# Patient Record
Sex: FEMALE | Race: BLACK OR AFRICAN AMERICAN | Employment: UNEMPLOYED | URBAN - METROPOLITAN AREA
[De-identification: names, ages, dates, MRNs, and addresses within clinical notes are randomized per-mention and may not be internally consistent; named-entity substitution may affect disease eponyms.]

---

## 2017-05-10 PROBLEM — F41.9 ANXIETY: Status: ACTIVE | Noted: 2017-05-10

## 2017-06-02 PROBLEM — R53.82 CHRONIC FATIGUE: Status: ACTIVE | Noted: 2017-06-02

## 2017-08-09 PROBLEM — E55.9 VITAMIN D DEFICIENCY: Status: ACTIVE | Noted: 2017-08-09

## 2019-10-01 PROBLEM — K21.9 GERD (GASTROESOPHAGEAL REFLUX DISEASE): Status: ACTIVE | Noted: 2017-04-28

## 2020-04-30 PROBLEM — G47.31 PRIMARY CENTRAL SLEEP APNEA: Status: ACTIVE | Noted: 2020-04-30

## 2020-04-30 PROBLEM — B35.1 DERMATOPHYTOSIS OF NAIL: Status: ACTIVE | Noted: 2020-04-30

## 2020-04-30 PROBLEM — R26.9 ABNORMAL GAIT: Status: ACTIVE | Noted: 2020-04-30

## 2020-05-18 PROBLEM — N34.2 INFECTIVE URETHRITIS: Status: ACTIVE | Noted: 2020-05-18

## 2020-05-18 PROBLEM — E87.5 HYPERKALEMIA: Status: ACTIVE | Noted: 2020-05-18

## 2020-05-18 PROBLEM — M79.89 LEG SWELLING: Status: ACTIVE | Noted: 2020-05-18

## 2020-05-18 PROBLEM — R25.3 JERKING: Status: ACTIVE | Noted: 2020-05-18

## 2020-05-18 PROBLEM — R30.0 DYSURIA: Status: ACTIVE | Noted: 2020-05-18

## 2022-05-18 ENCOUNTER — HOSPITAL ENCOUNTER (EMERGENCY)
Age: 87
Discharge: HOME | End: 2022-05-19
Attending: EMERGENCY MEDICINE
Payer: COMMERCIAL

## 2022-05-18 ENCOUNTER — APPOINTMENT (OUTPATIENT)
Dept: CT IMAGING | Age: 87
End: 2022-05-18
Payer: COMMERCIAL

## 2022-05-18 VITALS
HEART RATE: 72 BPM | SYSTOLIC BLOOD PRESSURE: 141 MMHG | HEIGHT: 66 IN | BODY MASS INDEX: 38.57 KG/M2 | WEIGHT: 240 LBS | OXYGEN SATURATION: 98 % | RESPIRATION RATE: 18 BRPM | TEMPERATURE: 98.6 F | DIASTOLIC BLOOD PRESSURE: 72 MMHG

## 2022-05-18 DIAGNOSIS — R51.9 SINUS HEADACHE: Primary | ICD-10-CM

## 2022-05-18 DIAGNOSIS — G44.89 OTHER HEADACHE SYNDROME: ICD-10-CM

## 2022-05-18 LAB
ABSOLUTE EOS #: 0.08 K/UL (ref 0–0.44)
ABSOLUTE IMMATURE GRANULOCYTE: 0.01 K/UL (ref 0–0.3)
ABSOLUTE LYMPH #: 2.25 K/UL (ref 1.1–3.7)
ABSOLUTE MONO #: 0.58 K/UL (ref 0.1–1.2)
ALBUMIN SERPL-MCNC: 4.2 G/DL (ref 3.5–5.2)
ALP BLD-CCNC: 82 U/L (ref 35–104)
ALT SERPL-CCNC: 12 U/L (ref 5–33)
ANION GAP SERPL CALCULATED.3IONS-SCNC: 12 MMOL/L (ref 9–17)
AST SERPL-CCNC: 14 U/L
BASOPHILS # BLD: 1 % (ref 0–2)
BASOPHILS ABSOLUTE: 0.06 K/UL (ref 0–0.2)
BILIRUB SERPL-MCNC: 0.21 MG/DL (ref 0.3–1.2)
BUN BLDV-MCNC: 29 MG/DL (ref 8–23)
BUN/CREAT BLD: 22 (ref 9–20)
CALCIUM SERPL-MCNC: 9.7 MG/DL (ref 8.6–10.4)
CHLORIDE BLD-SCNC: 102 MMOL/L (ref 98–107)
CO2: 26 MMOL/L (ref 20–31)
CREAT SERPL-MCNC: 1.31 MG/DL (ref 0.5–0.9)
EOSINOPHILS RELATIVE PERCENT: 1 % (ref 1–4)
FLU A ANTIGEN: NEGATIVE
FLU B ANTIGEN: NEGATIVE
GFR AFRICAN AMERICAN: 47 ML/MIN
GFR NON-AFRICAN AMERICAN: 39 ML/MIN
GFR SERPL CREATININE-BSD FRML MDRD: ABNORMAL ML/MIN/{1.73_M2}
GLUCOSE BLD-MCNC: 132 MG/DL (ref 70–99)
GLUCOSE BLD-MCNC: 139 MG/DL (ref 65–105)
HCT VFR BLD CALC: 46.2 % (ref 36.3–47.1)
HEMOGLOBIN: 14.7 G/DL (ref 11.9–15.1)
IMMATURE GRANULOCYTES: 0 %
LYMPHOCYTES # BLD: 35 % (ref 24–43)
MCH RBC QN AUTO: 31.3 PG (ref 25.2–33.5)
MCHC RBC AUTO-ENTMCNC: 31.8 G/DL (ref 28.4–34.8)
MCV RBC AUTO: 98.5 FL (ref 82.6–102.9)
MONOCYTES # BLD: 9 % (ref 3–12)
NRBC AUTOMATED: 0 PER 100 WBC
PDW BLD-RTO: 12.1 % (ref 11.8–14.4)
PLATELET # BLD: 281 K/UL (ref 138–453)
PMV BLD AUTO: 10 FL (ref 8.1–13.5)
POTASSIUM SERPL-SCNC: 4.5 MMOL/L (ref 3.7–5.3)
RBC # BLD: 4.69 M/UL (ref 3.95–5.11)
SEG NEUTROPHILS: 54 % (ref 36–65)
SEGMENTED NEUTROPHILS ABSOLUTE COUNT: 3.46 K/UL (ref 1.5–8.1)
SODIUM BLD-SCNC: 140 MMOL/L (ref 135–144)
TOTAL PROTEIN: 7.4 G/DL (ref 6.4–8.3)
WBC # BLD: 6.4 K/UL (ref 3.5–11.3)

## 2022-05-18 PROCEDURE — 82947 ASSAY GLUCOSE BLOOD QUANT: CPT

## 2022-05-18 PROCEDURE — 96374 THER/PROPH/DIAG INJ IV PUSH: CPT

## 2022-05-18 PROCEDURE — 70450 CT HEAD/BRAIN W/O DYE: CPT

## 2022-05-18 PROCEDURE — 80053 COMPREHEN METABOLIC PANEL: CPT

## 2022-05-18 PROCEDURE — 2580000003 HC RX 258: Performed by: PHYSICIAN ASSISTANT

## 2022-05-18 PROCEDURE — 6360000002 HC RX W HCPCS: Performed by: PHYSICIAN ASSISTANT

## 2022-05-18 PROCEDURE — 99284 EMERGENCY DEPT VISIT MOD MDM: CPT

## 2022-05-18 PROCEDURE — 85025 COMPLETE CBC W/AUTO DIFF WBC: CPT

## 2022-05-18 PROCEDURE — 87804 INFLUENZA ASSAY W/OPTIC: CPT

## 2022-05-18 PROCEDURE — 96375 TX/PRO/DX INJ NEW DRUG ADDON: CPT

## 2022-05-18 PROCEDURE — 96361 HYDRATE IV INFUSION ADD-ON: CPT

## 2022-05-18 PROCEDURE — 96376 TX/PRO/DX INJ SAME DRUG ADON: CPT

## 2022-05-18 RX ORDER — DEXAMETHASONE SODIUM PHOSPHATE 4 MG/ML
4 INJECTION, SOLUTION INTRA-ARTICULAR; INTRALESIONAL; INTRAMUSCULAR; INTRAVENOUS; SOFT TISSUE ONCE
Status: COMPLETED | OUTPATIENT
Start: 2022-05-18 | End: 2022-05-18

## 2022-05-18 RX ORDER — MORPHINE SULFATE 2 MG/ML
2 INJECTION, SOLUTION INTRAMUSCULAR; INTRAVENOUS ONCE
Status: COMPLETED | OUTPATIENT
Start: 2022-05-18 | End: 2022-05-18

## 2022-05-18 RX ORDER — KETOROLAC TROMETHAMINE 15 MG/ML
15 INJECTION, SOLUTION INTRAMUSCULAR; INTRAVENOUS ONCE
Status: COMPLETED | OUTPATIENT
Start: 2022-05-18 | End: 2022-05-18

## 2022-05-18 RX ORDER — MORPHINE SULFATE 2 MG/ML
2 INJECTION, SOLUTION INTRAMUSCULAR; INTRAVENOUS ONCE
Status: COMPLETED | OUTPATIENT
Start: 2022-05-18 | End: 2022-05-19

## 2022-05-18 RX ORDER — ONDANSETRON 2 MG/ML
4 INJECTION INTRAMUSCULAR; INTRAVENOUS ONCE
Status: COMPLETED | OUTPATIENT
Start: 2022-05-18 | End: 2022-05-18

## 2022-05-18 RX ORDER — 0.9 % SODIUM CHLORIDE 0.9 %
500 INTRAVENOUS SOLUTION INTRAVENOUS ONCE
Status: COMPLETED | OUTPATIENT
Start: 2022-05-18 | End: 2022-05-18

## 2022-05-18 RX ORDER — DIPHENHYDRAMINE HYDROCHLORIDE 50 MG/ML
12.5 INJECTION INTRAMUSCULAR; INTRAVENOUS ONCE
Status: COMPLETED | OUTPATIENT
Start: 2022-05-18 | End: 2022-05-18

## 2022-05-18 RX ADMIN — DEXAMETHASONE SODIUM PHOSPHATE 4 MG: 4 INJECTION, SOLUTION INTRAMUSCULAR; INTRAVENOUS at 22:23

## 2022-05-18 RX ADMIN — KETOROLAC TROMETHAMINE 15 MG: 15 INJECTION, SOLUTION INTRAMUSCULAR; INTRAVENOUS at 23:56

## 2022-05-18 RX ADMIN — ONDANSETRON 4 MG: 2 INJECTION INTRAMUSCULAR; INTRAVENOUS at 22:23

## 2022-05-18 RX ADMIN — SODIUM CHLORIDE 500 ML: 0.9 INJECTION, SOLUTION INTRAVENOUS at 22:24

## 2022-05-18 RX ADMIN — MORPHINE SULFATE 2 MG: 2 INJECTION, SOLUTION INTRAMUSCULAR; INTRAVENOUS at 22:38

## 2022-05-18 RX ADMIN — DIPHENHYDRAMINE HYDROCHLORIDE 12.5 MG: 50 INJECTION, SOLUTION INTRAMUSCULAR; INTRAVENOUS at 22:23

## 2022-05-18 ASSESSMENT — ENCOUNTER SYMPTOMS
BLURRED VISION: 0
VOMITING: 0
COUGH: 0
TINGLING: 0
ABDOMINAL PAIN: 0
VISUAL CHANGE: 0
SORE THROAT: 0
SWOLLEN GLANDS: 0
PHOTOPHOBIA: 0
NAUSEA: 0

## 2022-05-18 ASSESSMENT — PAIN SCALES - GENERAL: PAINLEVEL_OUTOF10: 10

## 2022-05-18 ASSESSMENT — PAIN - FUNCTIONAL ASSESSMENT: PAIN_FUNCTIONAL_ASSESSMENT: 0-10

## 2022-05-19 PROCEDURE — 6360000002 HC RX W HCPCS: Performed by: PHYSICIAN ASSISTANT

## 2022-05-19 RX ORDER — ONDANSETRON 4 MG/1
4 TABLET, ORALLY DISINTEGRATING ORAL EVERY 8 HOURS PRN
Qty: 15 TABLET | Refills: 0 | Status: SHIPPED | OUTPATIENT
Start: 2022-05-19

## 2022-05-19 RX ORDER — FLUTICASONE PROPIONATE 50 MCG
1 SPRAY, SUSPENSION (ML) NASAL DAILY
Qty: 16 G | Refills: 0 | Status: SHIPPED | OUTPATIENT
Start: 2022-05-19

## 2022-05-19 RX ORDER — DIPHENHYDRAMINE HCL 25 MG
25 CAPSULE ORAL 2 TIMES DAILY PRN
Qty: 15 CAPSULE | Refills: 0 | Status: SHIPPED | OUTPATIENT
Start: 2022-05-19 | End: 2022-05-29

## 2022-05-19 RX ADMIN — MORPHINE SULFATE 2 MG: 2 INJECTION, SOLUTION INTRAMUSCULAR; INTRAVENOUS at 00:02

## 2022-05-19 NOTE — ED PROVIDER NOTES
[facility] ED  eMERGENCY dEPARTMENT eNCOUnter      Pt Name: Melissa Manzano  MRN: [id number]  [place] 1935  Date of evaluation: 5/18/22      CHIEF COMPLAINT       Chief Complaint   Patient presents with    Headache     x4 days, history of migraines         HISTORY OF PRESENT ILLNESS    Melissa Mnazano is a 81 y.o. female who presents complaining of headache started about 4 days ago she has a history of migraines but has not had one in quite some time. This headache is similar to what she has had in the past was a gradual onset not the worst headache of her life. The history is provided by the patient. Headache  Pain location:  Generalized  Quality:  Dull  Onset quality:  Gradual  Duration:  2 days  Timing:  Intermittent  Progression:  Waxing and waning  Chronicity:  New  Similar to prior headaches: yes    Relieved by:  Nothing  Worsened by:  Nothing  Ineffective treatments:  None tried  Associated symptoms: no abdominal pain, no blurred vision, no cough, no dizziness, no focal weakness, no hearing loss, no loss of balance, no nausea, no neck pain, no neck stiffness, no numbness, no paresthesias, no photophobia, no seizures, no sore throat, no swollen glands, no syncope, no tingling, no visual change and no vomiting        REVIEW OF SYSTEMS       Review of Systems   HENT: Negative for hearing loss and sore throat. Eyes: Negative for blurred vision and photophobia. Respiratory: Negative for cough. Cardiovascular: Negative for syncope. Gastrointestinal: Negative for abdominal pain, nausea and vomiting. Musculoskeletal: Negative for neck pain and neck stiffness. Neurological: Positive for headaches. Negative for dizziness, focal weakness, seizures, numbness, paresthesias and loss of balance. All other systems reviewed and are negative.       PAST MEDICAL HISTORY     Past Medical History:   Diagnosis Date    Allergic rhinitis 7/7/2014    Asthma 8/28/2014    Back pain     Cataract     Diabetes mellitus ([facility] [address]     DM2 8/28/2014    Headache(784.0)     HTN (hypertension) 8/28/2014    Hypercholesterolemia     Hyperlipidemia     Hypertension     Insomnia, unspecified 8/28/2014    Lumbar stenosis     Neuropathy     Neuropathy 8/28/2014    Osteoarthritis     Osteoarthrosis 8/28/2014    Other acute reactions to stress 8/28/2014    Other secondary hypertension, benign 8/28/2014    Pure hypercholesterolemia 8/28/2014    Sleep apnea 8/28/2014    Unspecified sleep apnea        SURGICAL HISTORY       Past Surgical History:   Procedure Laterality Date    ARTERY BIOPSY Bilateral 08/05/2016    bilateral temporal artery biopsy    CATARACT REMOVAL      bilateral eyes    HEMORRHOID SURGERY         CURRENT MEDICATIONS       Previous Medications    AMLODIPINE (NORVASC) 10 MG TABLET    Take 1 tablet by mouth once daily    ASPIRIN 81 MG EC TABLET    Take 1 tablet by mouth daily    BLOOD GLUCOSE MONITOR STRIPS    1 strip by Other route daily Test 1 times a day & as needed for symptoms of irregular blood glucose. Dispense sufficient amount for indicated testing frequency plus additional to accommodate PRN testing needs.     CELECOXIB (CELEBREX) 200 MG CAPSULE    Take 1 capsule by mouth once daily    CONTINUOUS BLOOD GLUC  (FREESTYLE SANGEETHA 14 DAY READER) [facility]    USE AS DIRECTED TWICE DAILY    CONTINUOUS BLOOD GLUC SENSOR (FREESTYLE SANGEETHA 14 DAY SENSOR) [facility]    USE TO TEST TWICE DAILY    ELASTIC BANDAGES & SUPPORTS (WRIST SPLINT/COCK-UP/RIGHT M) MISC    1 each by Does not apply route daily    FREESTYLE LANCETS MISC    1 each by Does not apply route daily    FUROSEMIDE (LASIX) 20 MG TABLET    Take 1 tablet by mouth once daily    GABAPENTIN (NEURONTIN) 300 MG CAPSULE    TAKE 1 CAPSULE BY MOUTH THREE TIMES DAILY    GLUCOSE MONITORING (FREESTYLE FREEDOM) KIT    1 kit by Does not apply route daily    GLUCOSE MONITORING (FREESTYLE) KIT    1 kit by Does not apply route daily    LIDOCAINE (LIDODERM) 5 % Place 1-3 patches onto the skin every 12 hours    METOPROLOL SUCCINATE (TOPROL XL) 100 MG EXTENDED RELEASE TABLET    Take 1 tablet by mouth twice daily    OMEPRAZOLE (PRILOSEC) 20 MG DELAYED RELEASE CAPSULE    TAKE 1 CAPSULE BY MOUTH ONCE DAILY    ONDANSETRON (ZOFRAN) 4 MG TABLET    Take 1 tablet by mouth 3 times daily as needed for Nausea or Vomiting    OXYCODONE-ACETAMINOPHEN (PERCOCET) 5-325 MG PER TABLET    Take 1 tablet by mouth 2 times daily. PANTOPRAZOLE (PROTONIX) 40 MG TABLET    Take 1 tablet by mouth every morning (before breakfast)    POTASSIUM CHLORIDE (KLOR-CON) 10 MEQ EXTENDED RELEASE TABLET    TAKE 1 TABLET BY MOUTH ONCE DAILY WHEN TAKING LASIX    POTASSIUM CHLORIDE (KLOR-CON) 10 MEQ EXTENDED RELEASE TABLET    TAKE 1 TABLET BY MOUTH ONCE DAILY WHEN  TAKING  LASIX       ALLERGIES     is allergic to demerol hcl [meperidine], shellfish-derived products, cymbalta [duloxetine hcl], duloxetine, lamisil [terbinafine], morphine, penicillins, rosuvastatin calcium, statins, and terbinafine. FAMILY HISTORY     She indicated that her mother is . She indicated that her father is . She indicated that all of her three sisters are . She indicated that all of her four brothers are . She indicated that her maternal grandmother is . She indicated that her maternal grandfather is . She indicated that her paternal grandmother is . She indicated that her paternal grandfather is . SOCIAL HISTORY      reports that she quit smoking about 28 years ago. She has a 38.00 pack-year smoking history. She has never used smokeless tobacco. She reports that she does not drink alcohol and does not use drugs. PHYSICAL EXAM     INITIAL VITALS: BP (!) 141/72   Pulse 72   Temp 98.6 °F (37 °C) (Oral)   Resp 18   Ht 5' 6\" (1.676 m)   Wt 240 lb (108.9 kg)   SpO2 98%   BMI 38.74 kg/m²      Physical Exam  Vitals and nursing note reviewed.    Constitutional: Appearance: She is well-developed. HENT:      Head: Normocephalic and atraumatic. Eyes:      Extraocular Movements: Extraocular movements intact. Pupils: Pupils are equal, round, and reactive to light. Cardiovascular:      Rate and Rhythm: Normal rate and regular rhythm. Pulses: Normal pulses. Heart sounds: Normal heart sounds. No murmur heard. Pulmonary:      Effort: Pulmonary effort is normal.      Breath sounds: Normal breath sounds. Abdominal:      General: Bowel sounds are normal.      Palpations: Abdomen is soft. Tenderness: There is no abdominal tenderness. Musculoskeletal:         General: Normal range of motion. Cervical back: Normal range of motion. Skin:     General: Skin is warm and dry. Neurological:      Mental Status: She is alert and oriented to person, place, and time. Cranial Nerves: No cranial nerve deficit. Sensory: No sensory deficit. Motor: No weakness. Coordination: Coordination normal.      Gait: Gait normal.         MEDICAL DECISION MAKING:     Patient was headache not worst headache of her life is similar to what she has had in the past.  This headache started gradually about 4 days ago was not a thunderclap headache. While here in the emergency department she was given IV fluids we did a CT scan of the head checked labs and she was given analgesics. She reports she is feeling better although the headache is not completely resolved we did feed her while here in the emergency department she is feeling better and we will reevaluate after toradol . Feeling better we will treat as for her sinusitis. Discharge home with Flonase Benadryl and Zofran if needed. She should follow-up with a primary care provider in 1 to 2 days for recheck if her symptoms were to worsen or she has any other concerns please return to the emergency department.   Recommend cool-mist humidifier in the room at night hot tea and honey for cough if needed warm compresses to the forehead continue current medications. Patient verbalized understanding discharge instructions she is agreeable with the plan  DIAGNOSTIC RESULTS     EKG: All EKG's are interpreted by the Emergency Department Physician who either signs or Co-signs this chart in the absence of acardiologist.      RADIOLOGY:Allplain film, CT, MRI, and formal ultrasound images (except ED bedside ultrasound) are read by the radiologist and the images and interpretations are directly viewed by the emergency physician. LABS:All lab results were reviewed by myself, and all abnormals are listed below.   Labs Reviewed   COMPREHENSIVE METABOLIC PANEL W/ REFLEX TO MG FOR LOW K - Abnormal; Notable for the following components:       Result Value    Glucose 132 (*)     BUN 29 (*)     CREATININE 1.31 (*)     Bun/Cre Ratio 22 (*)     Total Bilirubin 0.21 (*)     GFR Non- 39 (*)     GFR  47 (*)     All other components within normal limits   POC GLUCOSE FINGERSTICK - Abnormal; Notable for the following components:    POC Glucose 139 (*)     All other components within normal limits   RAPID INFLUENZA A/B ANTIGENS   CBC WITH AUTO DIFFERENTIAL         EMERGENCY DEPARTMENT COURSE:   Vitals:    Vitals:    05/18/22 2039 05/18/22 2041   BP:  (!) 141/72   Pulse: 72    Resp: 18    Temp:  98.6 °F (37 °C)   TempSrc:  Oral   SpO2: 98%    Weight: 240 lb (108.9 kg)    Height: 5' 6\" (1.676 m)        The patient was given the following medications while in the emergency department:  Orders Placed This Encounter   Medications    0.9 % sodium chloride bolus    ondansetron (ZOFRAN) injection 4 mg    dexamethasone (DECADRON) injection 4 mg    diphenhydrAMINE (BENADRYL) injection 12.5 mg    morphine (PF) injection 2 mg    ketorolac (TORADOL) injection 15 mg    morphine (PF) injection 2 mg    diphenhydrAMINE (BENADRYL) 25 MG capsule     Sig: Take 1 capsule by mouth 2 times daily as needed for

## 2022-05-19 NOTE — ED NOTES
Entered pt room for IV insertion and medication administration. Pt not present at this time.      Debby Candelaria RN  05/18/22 [id number]

## 2024-08-29 ENCOUNTER — OFFICE VISIT (OUTPATIENT)
Dept: FAMILY MEDICINE CLINIC | Age: 89
End: 2024-08-29

## 2024-08-29 VITALS
HEART RATE: 63 BPM | OXYGEN SATURATION: 98 % | WEIGHT: 208.8 LBS | BODY MASS INDEX: 34.75 KG/M2 | DIASTOLIC BLOOD PRESSURE: 82 MMHG | TEMPERATURE: 97.4 F | SYSTOLIC BLOOD PRESSURE: 128 MMHG

## 2024-08-29 DIAGNOSIS — E55.9 VITAMIN D DEFICIENCY: ICD-10-CM

## 2024-08-29 DIAGNOSIS — I51.89 DIASTOLIC DYSFUNCTION: ICD-10-CM

## 2024-08-29 DIAGNOSIS — R60.9 NON-PITTING EDEMA: Primary | ICD-10-CM

## 2024-08-29 DIAGNOSIS — N18.31 CKD STAGE 3A, GFR 45-59 ML/MIN (HCC): ICD-10-CM

## 2024-08-29 DIAGNOSIS — I10 PRIMARY HYPERTENSION: ICD-10-CM

## 2024-08-29 DIAGNOSIS — E11.40 TYPE 2 DIABETES MELLITUS WITH DIABETIC NEUROPATHY, WITHOUT LONG-TERM CURRENT USE OF INSULIN (HCC): ICD-10-CM

## 2024-08-29 RX ORDER — FUROSEMIDE 20 MG
10 TABLET ORAL DAILY
Qty: 60 TABLET | Refills: 0
Start: 2024-08-29

## 2024-08-29 RX ORDER — FOLIC ACID 0.8 MG
TABLET ORAL
COMMUNITY

## 2024-08-29 RX ORDER — ZOLPIDEM TARTRATE 5 MG/1
5 TABLET ORAL NIGHTLY PRN
COMMUNITY
End: 2024-08-29

## 2024-08-29 ASSESSMENT — PATIENT HEALTH QUESTIONNAIRE - PHQ9
SUM OF ALL RESPONSES TO PHQ QUESTIONS 1-9: 2
SUM OF ALL RESPONSES TO PHQ QUESTIONS 1-9: 2
2. FEELING DOWN, DEPRESSED OR HOPELESS: SEVERAL DAYS
SUM OF ALL RESPONSES TO PHQ9 QUESTIONS 1 & 2: 2
SUM OF ALL RESPONSES TO PHQ QUESTIONS 1-9: 2
SUM OF ALL RESPONSES TO PHQ QUESTIONS 1-9: 2
1. LITTLE INTEREST OR PLEASURE IN DOING THINGS: SEVERAL DAYS

## 2024-08-29 ASSESSMENT — ENCOUNTER SYMPTOMS
SHORTNESS OF BREATH: 0
BLOOD IN STOOL: 0
WHEEZING: 0
DIARRHEA: 0
VOMITING: 0
CONSTIPATION: 1
NAUSEA: 0

## 2024-08-29 NOTE — ASSESSMENT & PLAN NOTE
On Norvasc, will hold for 10 days and reassess the patient.  However cannot rule out edema associated with diastolic heart failure given patient weight gain 4 kg in 1 month and patient has not been taking Lasix for a while.    Orders:    furosemide (LASIX) 20 MG tablet; Take 0.5 tablets by mouth daily

## 2024-08-29 NOTE — ASSESSMENT & PLAN NOTE
Need for interesting medications giving other medical problem, patient need to check her blood pressure regularly and keeps a log for every visit, DME order for a blood pressure cuff and machine placed, will check BMP to follow-up on electrolytes.    Orders:    DME Order for (Specify) as OP    furosemide (LASIX) 20 MG tablet; Take 0.5 tablets by mouth daily    Basic Metabolic Panel; Future

## 2024-08-29 NOTE — ASSESSMENT & PLAN NOTE
With 4 kg weight gain in 1 month, no associated shortness of breath or other symptoms, will resume Lasix with lower dose of 10 mg daily and to have patient do a BMP for her upcoming visits in 7 to 10 days

## 2024-08-29 NOTE — ASSESSMENT & PLAN NOTE
To keep a close monitoring last GFR of 47, to resume Lasix carefully with lower dose of 10 mg, recheck BMP.

## 2024-08-29 NOTE — ASSESSMENT & PLAN NOTE
Well-controlled will do yearly A1c check, last A1c 2 months ago was 5.7.          need for outpatient follow-up/lab results/radiology results/return to ED if symptoms worsen, persist or questions arise

## 2024-08-29 NOTE — PROGRESS NOTES
10 mg daily and to have patient do a BMP for her upcoming visits in 7 to 10 days         CKD stage 3a, GFR 45-59 ml/min (HCC)   To keep a close monitoring last GFR of 47, to resume Lasix carefully with lower dose of 10 mg, recheck BMP.         Primary hypertension    Need for interesting medications giving other medical problem, patient need to check her blood pressure regularly and keeps a log for every visit, DME order for a blood pressure cuff and machine placed, will check BMP to follow-up on electrolytes.    Orders:    DME Order for (Specify) as OP    furosemide (LASIX) 20 MG tablet; Take 0.5 tablets by mouth daily    Basic Metabolic Panel; Future    Type 2 diabetes mellitus with diabetic neuropathy, without long-term current use of insulin (HCC)   Well-controlled will do yearly A1c check, last A1c 2 months ago was 5.7.         Vitamin D deficiency   High risk will check lab    Orders:    Vitamin D 25 Hydroxy; Future      Return in about 10 days (around 9/8/2024).       Plan for Next Visit   1.)  Encourage lifestyle modification including diet and exercise for weight loss given BMI of 34.7.  2.) to keep an eye on kidney function while being on Lasix and she also on celecoxib and CKD 3A patient.  Patient to do BMP before her next appointment to follow-up on her creatinine.            Requested Prescriptions     Signed Prescriptions Disp Refills    furosemide (LASIX) 20 MG tablet 60 tablet 0     Sig: Take 0.5 tablets by mouth daily       Medications Discontinued During This Encounter   Medication Reason    glucose monitoring (FREESTYLE FREEDOM) kit LIST CLEANUP    ondansetron (ZOFRAN ODT) 4 MG disintegrating tablet LIST CLEANUP    vibegron (GEMTESA) 75 MG TABS tablet LIST CLEANUP    amLODIPine (NORVASC) 10 MG tablet Side effects    zolpidem (AMBIEN) 5 MG tablet LIST CLEANUP    FreeStyle Lancets MISC LIST CLEANUP    Continuous Blood Gluc Sensor (FREESTYLE SANGEETHA 14 DAY SENSOR) [facility] LIST CLEANUP    Continuous Blood  Gluc  ([employer] 14 DAY READER) [facility] LIST CLEANUP    furosemide (LASIX) 20 MG tablet        Greer received counseling on the following healthy behaviors: nutrition, exercise and medication adherence    Discussed use,benefit, and side effects of prescribed medications.  Barriers to medication compliance addressed.     Discussed with patient signs and symptoms that necessitate emergent medical attention going to ER.     All patient questions answered.  Pt voiced understanding.     Return in about 10 days (around 9/8/2024).      Disclaimer: Some orall of this note was transcribed using voice-recognition software.This may cause typographical errors occasionally. Although all effort is made to fix these errors, please do not hesitate to contact our office if there isany concern with the understanding of this note.      Electronically signed by CAMELIA SAAVEDRA MD on 8/29/2024 at 2:32 PM

## 2024-08-30 ENCOUNTER — TELEPHONE (OUTPATIENT)
Dept: FAMILY MEDICINE CLINIC | Age: 89
End: 2024-08-30

## 2024-08-30 NOTE — TELEPHONE ENCOUNTER
Called patient to inform her to call insurance to see if she can have a blood pressure cuff ordered through her insurance for her. Otherwise I gave her some online options to view as well. I tried calling [facility] which do not take her insurance, as well as [employer] who stated that her insurance will not cover a blood pressure cuff for her through them. I also left a message with the medical service company to see if they may be able to help but have not received a response as of yet.      Patient wanted to let provider know that she was asked to half her Lasix medication but do to the size she is unable to split it in half and asked if she can take it ever other day instead.

## 2024-09-03 ENCOUNTER — HOSPITAL ENCOUNTER (OUTPATIENT)
Age: 89
Setting detail: SPECIMEN
Discharge: HOME | End: 2024-09-03

## 2024-09-03 DIAGNOSIS — E55.9 VITAMIN D DEFICIENCY: ICD-10-CM

## 2024-09-03 DIAGNOSIS — I10 PRIMARY HYPERTENSION: ICD-10-CM

## 2024-09-03 LAB
25(OH)D3 SERPL-MCNC: 23.2 NG/ML (ref 30–100)
ANION GAP SERPL CALCULATED.3IONS-SCNC: 11 MMOL/L (ref 9–16)
BUN SERPL-MCNC: 22 MG/DL (ref 8–23)
CALCIUM SERPL-MCNC: 9.9 MG/DL (ref 8.6–10.4)
CHLORIDE SERPL-SCNC: 103 MMOL/L (ref 98–107)
CO2 SERPL-SCNC: 27 MMOL/L (ref 20–31)
CREAT SERPL-MCNC: 1.3 MG/DL (ref 0.5–0.9)
GFR, ESTIMATED: 40 ML/MIN/1.73M2
GLUCOSE SERPL-MCNC: 101 MG/DL (ref 74–99)
POTASSIUM SERPL-SCNC: 4.5 MMOL/L (ref 3.7–5.3)
SODIUM SERPL-SCNC: 141 MMOL/L (ref 136–145)

## 2024-09-04 ENCOUNTER — TELEPHONE (OUTPATIENT)
Dept: FAMILY MEDICINE CLINIC | Age: 89
End: 2024-09-04

## 2024-09-09 ENCOUNTER — OFFICE VISIT (OUTPATIENT)
Dept: FAMILY MEDICINE CLINIC | Age: 89
End: 2024-09-09

## 2024-09-09 VITALS
BODY MASS INDEX: 34.18 KG/M2 | HEART RATE: 62 BPM | SYSTOLIC BLOOD PRESSURE: 122 MMHG | OXYGEN SATURATION: 97 % | TEMPERATURE: 98.3 F | WEIGHT: 205.4 LBS | DIASTOLIC BLOOD PRESSURE: 70 MMHG

## 2024-09-09 DIAGNOSIS — G89.29 CHRONIC MIDLINE LOW BACK PAIN WITH BILATERAL SCIATICA: ICD-10-CM

## 2024-09-09 DIAGNOSIS — E78.5 DYSLIPIDEMIA: ICD-10-CM

## 2024-09-09 DIAGNOSIS — M54.42 CHRONIC MIDLINE LOW BACK PAIN WITH BILATERAL SCIATICA: ICD-10-CM

## 2024-09-09 DIAGNOSIS — M54.41 CHRONIC MIDLINE LOW BACK PAIN WITH BILATERAL SCIATICA: ICD-10-CM

## 2024-09-09 DIAGNOSIS — H61.22 IMPACTED CERUMEN, LEFT EAR: ICD-10-CM

## 2024-09-09 DIAGNOSIS — G56.21 ULNAR NERVE ENTRAPMENT AT ELBOW, RIGHT: ICD-10-CM

## 2024-09-09 DIAGNOSIS — I10 PRIMARY HYPERTENSION: Primary | ICD-10-CM

## 2024-09-09 RX ORDER — ADHESIVE BANDAGE 3/4"
5 BANDAGE TOPICAL 2 TIMES DAILY
Qty: 1 EACH | Refills: 0 | Status: SHIPPED | OUTPATIENT
Start: 2024-09-09 | End: 2024-09-19

## 2024-09-09 ASSESSMENT — ENCOUNTER SYMPTOMS
SORE THROAT: 0
BLOOD IN STOOL: 0
CONSTIPATION: 1
NAUSEA: 0
VOICE CHANGE: 0
FACIAL SWELLING: 0
WHEEZING: 0
SINUS PRESSURE: 0
VOMITING: 0
SHORTNESS OF BREATH: 0
RHINORRHEA: 0
SINUS PAIN: 0
DIARRHEA: 0
TROUBLE SWALLOWING: 0

## 2024-09-11 ENCOUNTER — TELEPHONE (OUTPATIENT)
Dept: FAMILY MEDICINE CLINIC | Age: 89
End: 2024-09-11

## 2024-09-25 ENCOUNTER — HOSPITAL ENCOUNTER (OUTPATIENT)
Age: 89
Setting detail: SPECIMEN
Discharge: HOME | End: 2024-09-25

## 2024-09-25 LAB
ANION GAP SERPL CALCULATED.3IONS-SCNC: 13 MMOL/L (ref 9–16)
BUN SERPL-MCNC: 17 MG/DL (ref 8–23)
CALCIUM SERPL-MCNC: 9.6 MG/DL (ref 8.6–10.4)
CHLORIDE SERPL-SCNC: 103 MMOL/L (ref 98–107)
CHOLEST SERPL-MCNC: 251 MG/DL (ref 0–199)
CHOLESTEROL/HDL RATIO: 4
CO2 SERPL-SCNC: 26 MMOL/L (ref 20–31)
CREAT SERPL-MCNC: 1.1 MG/DL (ref 0.5–0.9)
GFR, ESTIMATED: 50 ML/MIN/1.73M2
GLUCOSE SERPL-MCNC: 109 MG/DL (ref 74–99)
HDLC SERPL-MCNC: 62 MG/DL
LDLC SERPL CALC-MCNC: 168 MG/DL (ref 0–100)
POTASSIUM SERPL-SCNC: 4.6 MMOL/L (ref 3.7–5.3)
SODIUM SERPL-SCNC: 142 MMOL/L (ref 136–145)
TRIGL SERPL-MCNC: 106 MG/DL (ref 0–149)
VLDLC SERPL CALC-MCNC: 21 MG/DL

## 2024-10-01 ENCOUNTER — TELEPHONE (OUTPATIENT)
Dept: FAMILY MEDICINE CLINIC | Age: 89
End: 2024-10-01

## 2024-10-28 DIAGNOSIS — M25.561 ACUTE PAIN OF RIGHT KNEE: ICD-10-CM

## 2024-10-28 DIAGNOSIS — G63 POLYNEUROPATHY IN OTHER DISEASES CLASSIFIED ELSEWHERE (HCC): ICD-10-CM

## 2024-10-28 RX ORDER — METOPROLOL SUCCINATE 50 MG/1
50 TABLET, EXTENDED RELEASE ORAL 2 TIMES DAILY
Qty: 60 TABLET | Refills: 1 | Status: SHIPPED | OUTPATIENT
Start: 2024-10-28

## 2024-10-28 RX ORDER — CELECOXIB 200 MG/1
CAPSULE ORAL
Qty: 30 CAPSULE | Refills: 0 | Status: SHIPPED | OUTPATIENT
Start: 2024-10-28

## 2024-10-28 NOTE — TELEPHONE ENCOUNTER
Greer Rios is calling to request a refill on the following medication(s):    Medication Request:  Requested Prescriptions     Pending Prescriptions Disp Refills    celecoxib (CELEBREX) 200 MG capsule 30 capsule 0     Sig: Take 1 capsule by mouth once daily    metoprolol succinate (TOPROL XL) 50 MG extended release tablet 60 tablet 0     Sig: Take 1 tablet by mouth 2 times daily       Last Visit Date (If Applicable):  9/9/2024    Next Visit Date:    12/9/2024      Last refills 7/9 and 9/23/24. Prescriptions pending.

## 2024-11-24 DIAGNOSIS — M25.561 ACUTE PAIN OF RIGHT KNEE: ICD-10-CM

## 2024-11-24 DIAGNOSIS — G63 POLYNEUROPATHY IN OTHER DISEASES CLASSIFIED ELSEWHERE (HCC): ICD-10-CM

## 2024-11-25 RX ORDER — CELECOXIB 200 MG/1
CAPSULE ORAL
Qty: 30 CAPSULE | Refills: 0 | Status: SHIPPED | OUTPATIENT
Start: 2024-11-25

## 2024-11-25 NOTE — TELEPHONE ENCOUNTER
Greer Rios is calling to request a refill on the following medication(s):    Medication Request:  Requested Prescriptions     Pending Prescriptions Disp Refills    celecoxib (CELEBREX) 200 MG capsule [Pharmacy Med Name: Celecoxib 200 MG Oral Capsule] 30 capsule 0     Sig: Take 1 capsule by mouth once daily       Last Visit Date (If Applicable):  9/9/2024    Next Visit Date:    12/9/2024

## 2024-11-26 DIAGNOSIS — G63 POLYNEUROPATHY IN OTHER DISEASES CLASSIFIED ELSEWHERE (HCC): ICD-10-CM

## 2024-11-26 DIAGNOSIS — M25.561 ACUTE PAIN OF RIGHT KNEE: ICD-10-CM

## 2024-11-26 RX ORDER — CELECOXIB 200 MG/1
CAPSULE ORAL
Qty: 30 CAPSULE | Refills: 0 | OUTPATIENT
Start: 2024-11-26

## 2024-12-09 ENCOUNTER — OFFICE VISIT (OUTPATIENT)
Dept: FAMILY MEDICINE CLINIC | Age: 89
End: 2024-12-09

## 2024-12-09 VITALS
BODY MASS INDEX: 34.45 KG/M2 | DIASTOLIC BLOOD PRESSURE: 70 MMHG | HEART RATE: 55 BPM | WEIGHT: 207 LBS | SYSTOLIC BLOOD PRESSURE: 140 MMHG | OXYGEN SATURATION: 95 % | TEMPERATURE: 97 F

## 2024-12-09 DIAGNOSIS — F51.01 PRIMARY INSOMNIA: ICD-10-CM

## 2024-12-09 DIAGNOSIS — H61.22 IMPACTED CERUMEN, LEFT EAR: ICD-10-CM

## 2024-12-09 DIAGNOSIS — M85.88 MASS OF HARD PALATE: Primary | ICD-10-CM

## 2024-12-09 DIAGNOSIS — J02.9 ACUTE PHARYNGITIS, UNSPECIFIED ETIOLOGY: ICD-10-CM

## 2024-12-09 DIAGNOSIS — N18.31 CKD STAGE 3A, GFR 45-59 ML/MIN (HCC): ICD-10-CM

## 2024-12-09 DIAGNOSIS — E11.40 TYPE 2 DIABETES MELLITUS WITH DIABETIC NEUROPATHY, WITHOUT LONG-TERM CURRENT USE OF INSULIN (HCC): ICD-10-CM

## 2024-12-09 DIAGNOSIS — I10 PRIMARY HYPERTENSION: ICD-10-CM

## 2024-12-09 DIAGNOSIS — G56.21 ULNAR NERVE ENTRAPMENT AT ELBOW, RIGHT: ICD-10-CM

## 2024-12-09 LAB — HBA1C MFR BLD: 6 %

## 2024-12-09 ASSESSMENT — ENCOUNTER SYMPTOMS
SORE THROAT: 1
COUGH: 1
VOICE CHANGE: 0
TROUBLE SWALLOWING: 0
SINUS PAIN: 0
RHINORRHEA: 0
SHORTNESS OF BREATH: 0
WHEEZING: 0
VOMITING: 0
CONSTIPATION: 1
SINUS PRESSURE: 0
BLOOD IN STOOL: 0
NAUSEA: 0
FACIAL SWELLING: 0
DIARRHEA: 0

## 2024-12-09 NOTE — ASSESSMENT & PLAN NOTE
Chronic, at goal (stable), A1c today 6 continue same plan of care with lifestyle modifications and check A1c every 6 months and lifestyle modifications recommended    Orders:    POCT glycosylated hemoglobin (Hb A1C)

## 2024-12-09 NOTE — ASSESSMENT & PLAN NOTE
Care instruction discussed we will give melatonin as trial    Orders:    melatonin (RA MELATONIN) 3 MG TABS tablet; Take 1 tablet by mouth daily

## 2024-12-09 NOTE — ASSESSMENT & PLAN NOTE
Chronic, at goal (stable), continue Lasix every other day and BMP check every 3 months, medication adherence emphasized, and lifestyle modifications recommended

## 2024-12-09 NOTE — PATIENT INSTRUCTIONS
Sore Throat: Care Instructions  Overview     Infection by bacteria or a virus causes most sore throats. Cigarette smoke, dry air, air pollution, allergies, and yelling can also cause a sore throat. Sore throats can be painful and annoying. Fortunately, most sore throats go away on their own. If you have a bacterial infection, your doctor may prescribe antibiotics.  Follow-up care is a key part of your treatment and safety. Be sure to make and go to all appointments, and call your doctor if you are having problems. It's also a good idea to know your test results and keep a list of the medicines you take.  How can you care for yourself at home?  Gargle with warm salt water several times a day to help reduce swelling and relieve pain. Mix 1/2 teaspoon of salt in 1 cup of warm water.  Take an over-the-counter pain acetaminophen (Tylenol) Be careful when taking over-the-counter cold or flu medicines and Tylenol at the same time. Many of these medicines have acetaminophen, which is Tylenol. Read the labels to make sure that you are not taking more than the recommended dose. Too much acetaminophen (Tylenol) can be harmful.  Drink plenty of fluids. Fluids may help soothe an irritated throat. Hot fluids, such as tea or soup, may help decrease throat pain.  Use over-the-counter throat lozenges to soothe pain. Regular cough drops or hard candy may also help. These should not be given to young children because of the risk of choking.  Do not smoke or allow others to smoke around you. If you need help quitting, talk to your doctor about stop-smoking programs and medicines. These can increase your chances of quitting for good.  Use a vaporizer or humidifier to add moisture to your bedroom. Follow the directions for cleaning the machine.

## 2024-12-09 NOTE — ASSESSMENT & PLAN NOTE
Unsuccessful irrigation will recheck next visit and try annual current removal    Orders:    REMOVE IMPACTED EAR WAX

## 2024-12-09 NOTE — ASSESSMENT & PLAN NOTE
Chronic, not at goal (unstable), will recheck after patient starting back on Lasix every other day, medication adherence emphasized, and lifestyle modifications recommended

## 2024-12-09 NOTE — ASSESSMENT & PLAN NOTE
Care instruction discussed and given    Orders:    benzocaine-menthol (CEPACOL SORE THROAT EX ST) 15-3.6 MG lozenge; Take 1 lozenge by mouth every 2 hours as needed for Sore Throat

## 2024-12-09 NOTE — ASSESSMENT & PLAN NOTE
Same with numbness of the ulnar side, brace was not covered, provided with Ace wrap for trial.

## 2024-12-09 NOTE — PROGRESS NOTES
Greer Rios (:  1935) is a 89 y.o. female,New patient, here for evaluation of the following chief complaint(s):  Diabetes, Hypertension, and Medication review (Celebrex and Gabapentin)    H&P    This is 89 years old female with Hx of essential HTN, diastolic heart failure, pulmonary hypertension, CKD stage IIIa, well-controlled DM type II, chronic lower back pain 2/2 arthritis follows with pain management on Percocet for chronic treatment and uses walker for ambulation.    Reported sore throat and an infrequent cough over the last few    Patient reported improvement in leg swelling over the last few weeks after we restart back on the Lasix, patient has not been compliant to low-salt diet, leg compression.      Patient denied shortness of breath or issues with sleeping.    Patient reported right-sided tingling and numbness of the fourth and fifth fingers has been going on for few weeks now, patient reported that she is applying a pressure on her right elbow while sitting and made aware that she needs to change her habits to help on the nerve improved pressure inflammation that is causing the symptoms.  Patient voiced understanding.      Menstruation: No LMP recorded. Patient is postmenopausal.    Screening for Depression: Adult population (age 18 and older)  - PHQ-9 score today:   Interpretation:  5-9 mild   10-14 moderate   15-19 moderately severe   20-27 severe       Screening for CVDs risk:  The ASCVD Risk score (Hammond DK, et al., 2019) failed to calculate for the following reasons:    The 2019 ASCVD risk score is only valid for ages 40 to 79       Regular Annual Weight, Height and BMI percentile and BMI Monitoring:  Body mass index is 34.45 kg/m².   30-34.9 - Obesity Grade I  Adults with a BMI 25-39.9 (overweight and obese) would be allowed 4 preventive health office visits and unlimited nutritional counseling visits specifically for obesity per year and one (1) set of recommended laboratory
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
## 2024-12-23 ENCOUNTER — OFFICE VISIT (OUTPATIENT)
Dept: FAMILY MEDICINE CLINIC | Age: 89
End: 2024-12-23
Payer: COMMERCIAL

## 2024-12-23 VITALS
HEART RATE: 66 BPM | TEMPERATURE: 97.5 F | DIASTOLIC BLOOD PRESSURE: 70 MMHG | SYSTOLIC BLOOD PRESSURE: 130 MMHG | OXYGEN SATURATION: 99 %

## 2024-12-23 DIAGNOSIS — M54.41 CHRONIC MIDLINE LOW BACK PAIN WITH BILATERAL SCIATICA: ICD-10-CM

## 2024-12-23 DIAGNOSIS — I10 PRIMARY HYPERTENSION: ICD-10-CM

## 2024-12-23 DIAGNOSIS — M54.42 CHRONIC MIDLINE LOW BACK PAIN WITH BILATERAL SCIATICA: ICD-10-CM

## 2024-12-23 DIAGNOSIS — G56.21 ULNAR NERVE ENTRAPMENT AT ELBOW, RIGHT: ICD-10-CM

## 2024-12-23 DIAGNOSIS — K21.9 GASTROESOPHAGEAL REFLUX DISEASE WITHOUT ESOPHAGITIS: ICD-10-CM

## 2024-12-23 DIAGNOSIS — E11.40 TYPE 2 DIABETES MELLITUS WITH DIABETIC NEUROPATHY, WITHOUT LONG-TERM CURRENT USE OF INSULIN (HCC): Primary | ICD-10-CM

## 2024-12-23 DIAGNOSIS — G89.29 CHRONIC MIDLINE LOW BACK PAIN WITH BILATERAL SCIATICA: ICD-10-CM

## 2024-12-23 DIAGNOSIS — H61.22 IMPACTED CERUMEN, LEFT EAR: ICD-10-CM

## 2024-12-23 DIAGNOSIS — M85.88 MASS OF HARD PALATE: ICD-10-CM

## 2024-12-23 DIAGNOSIS — J02.9 ACUTE PHARYNGITIS, UNSPECIFIED ETIOLOGY: ICD-10-CM

## 2024-12-23 PROCEDURE — 1123F ACP DISCUSS/DSCN MKR DOCD: CPT

## 2024-12-23 PROCEDURE — 1159F MED LIST DOCD IN RCRD: CPT

## 2024-12-23 PROCEDURE — 99214 OFFICE O/P EST MOD 30 MIN: CPT

## 2024-12-23 PROCEDURE — 3044F HG A1C LEVEL LT 7.0%: CPT

## 2024-12-23 ASSESSMENT — ENCOUNTER SYMPTOMS
COUGH: 1
CONSTIPATION: 1
SORE THROAT: 1
VOICE CHANGE: 0
TROUBLE SWALLOWING: 0
RHINORRHEA: 0
SINUS PRESSURE: 0
VOMITING: 0
FACIAL SWELLING: 0
WHEEZING: 0
BLOOD IN STOOL: 0
SINUS PAIN: 0
NAUSEA: 0
DIARRHEA: 0
SHORTNESS OF BREATH: 0

## 2024-12-23 NOTE — ASSESSMENT & PLAN NOTE
Chronic, at goal (stable), continue current medications with Lasix every other day, medication adherence emphasized, and lifestyle modifications recommended

## 2024-12-23 NOTE — ASSESSMENT & PLAN NOTE
Chronic, not at goal (unstable), follows with pain management on opioid,She reports that Celebrex and gabapentin are not helping with her pain. These medications will be discontinued from her regimen., medication adherence emphasized, and lifestyle modifications recommended

## 2024-12-23 NOTE — ASSESSMENT & PLAN NOTE
Chronic, at goal (stable), She is currently on both omeprazole and Protonix 40 mg daily before breakfast for heartburn.  Will discontinue omeprazole and continue Protonix

## 2024-12-23 NOTE — ASSESSMENT & PLAN NOTE
Chronic, not at goal (unstable), The brace was not covered by insurance, and she was given an Ace wrap, which she finds difficult to use alone. No further treatment is necessary at this time.
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
## 2024-12-23 NOTE — ASSESSMENT & PLAN NOTE
Acute condition, recurrent, disimpacted with curettes Ear Cerumen Removal    Ear Cerumen Removal    Date/Time: 12/23/2024 1:30 PM    Performed by: Luis Arellano MD  Authorized by: Luis Arellano MD    Consent:     Consent obtained:  Verbal    Consent given by:  Patient    Risks discussed:  Bleeding, infection, pain, TM perforation, incomplete removal and dizziness    Alternatives discussed:  No treatment, delayed treatment, alternative treatment and observation  Universal protocol:     Procedure explained and questions answered to patient or proxy's satisfaction: yes      Relevant documents present and verified: yes      Patient identity confirmed:  Verbally with patient  Procedure details:     Location:  L ear    Procedure type: curette      Procedure outcomes: cerumen removed    Post-procedure details:     Inspection:  No bleeding and TM intact    Hearing quality:  Normal    Procedure completion:  Tolerated well, no immediate complications

## 2024-12-23 NOTE — ASSESSMENT & PLAN NOTE
Acute condition, new, Her pharyngitis is showing signs of improvement, although a residual cough persists. The cough is not causing significant discomfort. She is advised to continue using Cepacol cough drops as needed for symptom relief.

## 2024-12-23 NOTE — ASSESSMENT & PLAN NOTE
Chronic, at goal (stable), A1c today 6 continue same plan of care with lifestyle modifications and check A1c every 6 months and lifestyle modifications recommended
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
## 2024-12-30 RX ORDER — METOPROLOL SUCCINATE 50 MG/1
50 TABLET, EXTENDED RELEASE ORAL 2 TIMES DAILY
Qty: 60 TABLET | Refills: 0 | Status: SHIPPED | OUTPATIENT
Start: 2024-12-30

## 2024-12-30 NOTE — TELEPHONE ENCOUNTER
Greer Rios is calling to request a refill on the following medication(s):    Medication Request:  Requested Prescriptions     Pending Prescriptions Disp Refills    metoprolol succinate (TOPROL XL) 50 MG extended release tablet [Pharmacy Med Name: Metoprolol Succinate ER 50 MG Oral Tablet Extended Release 24 Hour] 60 tablet 0     Sig: Take 1 tablet by mouth twice daily       Last Visit Date (If Applicable):  12/23/2024    Next Visit Date:    3/12/2025

## 2025-01-22 ENCOUNTER — HOSPITAL ENCOUNTER (EMERGENCY)
Age: 89
Discharge: HOME | End: 2025-01-22
Attending: EMERGENCY MEDICINE
Payer: COMMERCIAL

## 2025-01-22 ENCOUNTER — TELEPHONE (OUTPATIENT)
Dept: FAMILY MEDICINE CLINIC | Age: 89
End: 2025-01-22

## 2025-01-22 ENCOUNTER — APPOINTMENT (OUTPATIENT)
Dept: CT IMAGING | Age: 89
End: 2025-01-22
Payer: COMMERCIAL

## 2025-01-22 VITALS
DIASTOLIC BLOOD PRESSURE: 79 MMHG | TEMPERATURE: 96.9 F | OXYGEN SATURATION: 93 % | SYSTOLIC BLOOD PRESSURE: 123 MMHG | WEIGHT: 200 LBS | HEIGHT: 65 IN | BODY MASS INDEX: 33.32 KG/M2 | HEART RATE: 73 BPM | RESPIRATION RATE: 16 BRPM

## 2025-01-22 DIAGNOSIS — K92.1 BLOOD IN STOOL: Primary | ICD-10-CM

## 2025-01-22 LAB
ANION GAP SERPL CALCULATED.3IONS-SCNC: 10 MMOL/L (ref 9–16)
BASOPHILS # BLD: 0.05 K/UL (ref 0–0.2)
BASOPHILS NFR BLD: 1 % (ref 0–2)
BUN SERPL-MCNC: 22 MG/DL (ref 8–23)
CALCIUM SERPL-MCNC: 9.6 MG/DL (ref 8.8–10.2)
CHLORIDE SERPL-SCNC: 100 MMOL/L (ref 98–107)
CO2 SERPL-SCNC: 28 MMOL/L (ref 20–31)
CREAT SERPL-MCNC: 1.1 MG/DL (ref 0.5–0.9)
DATE, STOOL #1: ABNORMAL
EOSINOPHIL # BLD: <0.03 K/UL (ref 0–0.44)
EOSINOPHILS RELATIVE PERCENT: 0 % (ref 1–4)
ERYTHROCYTE [DISTWIDTH] IN BLOOD BY AUTOMATED COUNT: 11.9 % (ref 11.8–14.4)
GFR, ESTIMATED: 47 ML/MIN/1.73M2
GLUCOSE SERPL-MCNC: 132 MG/DL (ref 82–115)
HCT VFR BLD AUTO: 40.2 % (ref 36.3–47.1)
HEMOCCULT SP1 STL QL: POSITIVE
HGB BLD-MCNC: 13.4 G/DL (ref 11.9–15.1)
IMM GRANULOCYTES # BLD AUTO: 0.03 K/UL (ref 0–0.3)
IMM GRANULOCYTES NFR BLD: 1 %
LYMPHOCYTES NFR BLD: 1.38 K/UL (ref 1.1–3.7)
LYMPHOCYTES RELATIVE PERCENT: 23 % (ref 24–43)
MCH RBC QN AUTO: 32 PG (ref 25.2–33.5)
MCHC RBC AUTO-ENTMCNC: 33.3 G/DL (ref 28.4–34.8)
MCV RBC AUTO: 95.9 FL (ref 82.6–102.9)
MONOCYTES NFR BLD: 0.45 K/UL (ref 0.1–1.2)
MONOCYTES NFR BLD: 8 % (ref 3–12)
NEUTROPHILS NFR BLD: 67 % (ref 36–65)
NEUTS SEG NFR BLD: 4.01 K/UL (ref 1.5–8.1)
NRBC BLD-RTO: 0 PER 100 WBC
PLATELET # BLD AUTO: 270 K/UL (ref 138–453)
PMV BLD AUTO: 9.6 FL (ref 8.1–13.5)
POTASSIUM SERPL-SCNC: 4.4 MMOL/L (ref 3.7–5.3)
RBC # BLD AUTO: 4.19 M/UL (ref 3.95–5.11)
SODIUM SERPL-SCNC: 138 MMOL/L (ref 136–145)
TIME, STOOL #1: 1015
WBC OTHER # BLD: 5.9 K/UL (ref 3.5–11.3)

## 2025-01-22 PROCEDURE — 2500000003 HC RX 250 WO HCPCS: Performed by: EMERGENCY MEDICINE

## 2025-01-22 PROCEDURE — 74177 CT ABD & PELVIS W/CONTRAST: CPT

## 2025-01-22 PROCEDURE — 80048 BASIC METABOLIC PNL TOTAL CA: CPT

## 2025-01-22 PROCEDURE — 6360000004 HC RX CONTRAST MEDICATION: Performed by: EMERGENCY MEDICINE

## 2025-01-22 PROCEDURE — 99285 EMERGENCY DEPT VISIT HI MDM: CPT

## 2025-01-22 PROCEDURE — 85025 COMPLETE CBC W/AUTO DIFF WBC: CPT

## 2025-01-22 PROCEDURE — 82272 OCCULT BLD FECES 1-3 TESTS: CPT

## 2025-01-22 PROCEDURE — 96374 THER/PROPH/DIAG INJ IV PUSH: CPT

## 2025-01-22 PROCEDURE — 2580000003 HC RX 258: Performed by: EMERGENCY MEDICINE

## 2025-01-22 RX ORDER — 0.9 % SODIUM CHLORIDE 0.9 %
80 INTRAVENOUS SOLUTION INTRAVENOUS ONCE
Status: COMPLETED | OUTPATIENT
Start: 2025-01-22 | End: 2025-01-22

## 2025-01-22 RX ORDER — SODIUM CHLORIDE 0.9 % (FLUSH) 0.9 %
10 SYRINGE (ML) INJECTION PRN
Status: DISCONTINUED | OUTPATIENT
Start: 2025-01-22 | End: 2025-01-22 | Stop reason: HOSPADM

## 2025-01-22 RX ORDER — PANTOPRAZOLE SODIUM 20 MG/1
40 TABLET, DELAYED RELEASE ORAL DAILY
Qty: 30 TABLET | Refills: 0 | Status: SHIPPED | OUTPATIENT
Start: 2025-01-22

## 2025-01-22 RX ORDER — IOPAMIDOL 755 MG/ML
75 INJECTION, SOLUTION INTRAVASCULAR
Status: COMPLETED | OUTPATIENT
Start: 2025-01-22 | End: 2025-01-22

## 2025-01-22 RX ADMIN — SODIUM CHLORIDE, PRESERVATIVE FREE 10 ML: 5 INJECTION INTRAVENOUS at 11:36

## 2025-01-22 RX ADMIN — SODIUM CHLORIDE 80 ML: 9 INJECTION, SOLUTION INTRAVENOUS at 11:36

## 2025-01-22 RX ADMIN — IOPAMIDOL 75 ML: 755 INJECTION, SOLUTION INTRAVENOUS at 11:36

## 2025-01-22 ASSESSMENT — PAIN DESCRIPTION - LOCATION: LOCATION: ABDOMEN

## 2025-01-22 ASSESSMENT — LIFESTYLE VARIABLES
HOW OFTEN DO YOU HAVE A DRINK CONTAINING ALCOHOL: NEVER
HOW MANY STANDARD DRINKS CONTAINING ALCOHOL DO YOU HAVE ON A TYPICAL DAY: PATIENT DOES NOT DRINK

## 2025-01-22 ASSESSMENT — ENCOUNTER SYMPTOMS: ABDOMINAL PAIN: 1

## 2025-01-22 ASSESSMENT — PAIN - FUNCTIONAL ASSESSMENT: PAIN_FUNCTIONAL_ASSESSMENT: 0-10

## 2025-01-22 ASSESSMENT — PAIN SCALES - GENERAL: PAINLEVEL_OUTOF10: 3

## 2025-01-22 NOTE — ED NOTES
Pt presenting to the ED with complaints of abd pain as well as possible dark/rectal bleeding. Pt reports that she noticed this this morning.

## 2025-01-22 NOTE — TELEPHONE ENCOUNTER
Patient called and stated she had abdominal pain this morning and she had black stool. I advised ER. Patient is going to [facility].

## 2025-01-22 NOTE — ED PROVIDER NOTES
EMERGENCY DEPARTMENT ENCOUNTER    Pt Name: Greer Rios  MRN: [id number]  Birthdate 1935  Date of evaluation: 1/22/25  CHIEF COMPLAINT       Chief Complaint   Patient presents with    Abdominal Pain    Melena     State awoke with morning with abd pain and started having black loose stools, told to go the the ER for Evaluation by family doctor     HISTORY OF PRESENT ILLNESS   89-year-old female presents emergency room for an episode of abdominal cramping and dark stool this morning.  Patient reported her abdomen was hurting quite a bit.  She had a bowel movement and thought it looked darker than normal.  She called her primary care doctor who instructed her to come to the emergency room.  Pain has since resolved.  Patient denies any vomiting.  She did not have any dark stools yesterday.             REVIEW OF SYSTEMS     Review of Systems   Gastrointestinal:  Positive for abdominal pain.     PASTMEDICAL HISTORY     Past Medical History:   Diagnosis Date    Allergic rhinitis 7/7/2014    Asthma 8/28/2014    Back pain     Cataract     Diabetes mellitus (HCC)     DM2 8/28/2014    Headache(784.0)     HTN (hypertension) 8/28/2014    Hypercholesterolemia     Hyperlipidemia     Hypertension     Insomnia, unspecified 8/28/2014    Lumbar stenosis     Neuropathy     Neuropathy 8/28/2014    Osteoarthritis     Osteoarthrosis 8/28/2014    Other acute reactions to stress 8/28/2014    Other secondary hypertension, benign 8/28/2014    Pure hypercholesterolemia 8/28/2014    Sleep apnea 8/28/2014    Unspecified sleep apnea      Past Problem List  Patient Active Problem List   Diagnosis Code    Headache around the eyes R51.9    Neuropathy G62.9    Lumbar stenosis M48.061    Allergic rhinitis J30.9    DM2 E11.9    Pure hypercholesterolemia E78.00    Hypertension I10    Insomnia G47.00    Asthma J45.909    Osteoarthrosis involving lower leg [id number]    Neuropathy G63    Other secondary hypertension, benign I15.9    Sleep apnea G47.30

## 2025-01-22 NOTE — DISCHARGE INSTRUCTIONS
Continue with Protonix if you are taking this still.    Monitor for any continued bleeding.  If bleeding does become worse or you have concerns you may be reevaluated in the emergency room at any time.    Your blood work appears stable.  I recommend follow-up with GI when possible.    As we discussed there was an incidental finding of cysts in your kidneys-this requires follow-up with your primary care provider for reimaging.

## 2025-01-31 NOTE — TELEPHONE ENCOUNTER
Greer Rios is calling to request a refill on the following medication(s):    Medication Request:  Requested Prescriptions     Pending Prescriptions Disp Refills    metoprolol succinate (TOPROL XL) 50 MG extended release tablet [Pharmacy Med Name: Metoprolol Succinate ER 50 MG Oral Tablet Extended Release 24 Hour] 60 tablet 3     Sig: Take 1 tablet by mouth twice daily       Last Visit Date (If Applicable):  12/23/2024    Next Visit Date:    3/12/2025

## 2025-02-02 RX ORDER — METOPROLOL SUCCINATE 50 MG/1
50 TABLET, EXTENDED RELEASE ORAL 2 TIMES DAILY
Qty: 60 TABLET | Refills: 3 | Status: SHIPPED | OUTPATIENT
Start: 2025-02-02

## 2025-03-12 ENCOUNTER — OFFICE VISIT (OUTPATIENT)
Dept: FAMILY MEDICINE CLINIC | Age: 89
End: 2025-03-12
Payer: COMMERCIAL

## 2025-03-12 ENCOUNTER — HOSPITAL ENCOUNTER (OUTPATIENT)
Age: 89
Setting detail: SPECIMEN
Discharge: HOME | End: 2025-03-12

## 2025-03-12 VITALS
OXYGEN SATURATION: 95 % | RESPIRATION RATE: 18 BRPM | TEMPERATURE: 98 F | HEIGHT: 66 IN | BODY MASS INDEX: 30.94 KG/M2 | DIASTOLIC BLOOD PRESSURE: 82 MMHG | WEIGHT: 192.5 LBS | SYSTOLIC BLOOD PRESSURE: 138 MMHG | HEART RATE: 63 BPM

## 2025-03-12 DIAGNOSIS — E55.9 VITAMIN D DEFICIENCY: ICD-10-CM

## 2025-03-12 DIAGNOSIS — I10 PRIMARY HYPERTENSION: ICD-10-CM

## 2025-03-12 DIAGNOSIS — E78.5 DYSLIPIDEMIA: ICD-10-CM

## 2025-03-12 DIAGNOSIS — L30.9 ACUTE DERMATITIS: Primary | ICD-10-CM

## 2025-03-12 DIAGNOSIS — N18.31 CKD STAGE 3A, GFR 45-59 ML/MIN (HCC): ICD-10-CM

## 2025-03-12 LAB
25(OH)D3 SERPL-MCNC: 32.4 NG/ML (ref 30–100)
ANION GAP SERPL CALCULATED.3IONS-SCNC: 11 MMOL/L (ref 9–16)
BUN SERPL-MCNC: 16 MG/DL (ref 8–23)
CALCIUM SERPL-MCNC: 10.1 MG/DL (ref 8.6–10.4)
CHLORIDE SERPL-SCNC: 101 MMOL/L (ref 98–107)
CO2 SERPL-SCNC: 27 MMOL/L (ref 20–31)
CREAT SERPL-MCNC: 1.2 MG/DL (ref 0.6–0.9)
GFR, ESTIMATED: 44 ML/MIN/1.73M2
GLUCOSE SERPL-MCNC: 126 MG/DL (ref 74–99)
POTASSIUM SERPL-SCNC: 4.2 MMOL/L (ref 3.7–5.3)
SODIUM SERPL-SCNC: 139 MMOL/L (ref 136–145)

## 2025-03-12 PROCEDURE — 99214 OFFICE O/P EST MOD 30 MIN: CPT

## 2025-03-12 PROCEDURE — 1159F MED LIST DOCD IN RCRD: CPT

## 2025-03-12 PROCEDURE — 1123F ACP DISCUSS/DSCN MKR DOCD: CPT

## 2025-03-12 RX ORDER — BENZOCAINE/MENTHOL 6 MG-10 MG
LOZENGE MUCOUS MEMBRANE
Qty: 30 G | Refills: 1 | Status: SHIPPED | OUTPATIENT
Start: 2025-03-12 | End: 2025-03-19

## 2025-03-12 SDOH — ECONOMIC STABILITY: FOOD INSECURITY: WITHIN THE PAST 12 MONTHS, YOU WORRIED THAT YOUR FOOD WOULD RUN OUT BEFORE YOU GOT MONEY TO BUY MORE.: NEVER TRUE

## 2025-03-12 SDOH — ECONOMIC STABILITY: FOOD INSECURITY: WITHIN THE PAST 12 MONTHS, THE FOOD YOU BOUGHT JUST DIDN'T LAST AND YOU DIDN'T HAVE MONEY TO GET MORE.: NEVER TRUE

## 2025-03-12 ASSESSMENT — PATIENT HEALTH QUESTIONNAIRE - PHQ9
SUM OF ALL RESPONSES TO PHQ QUESTIONS 1-9: 0
2. FEELING DOWN, DEPRESSED OR HOPELESS: NOT AT ALL
SUM OF ALL RESPONSES TO PHQ QUESTIONS 1-9: 0
SUM OF ALL RESPONSES TO PHQ QUESTIONS 1-9: 0
1. LITTLE INTEREST OR PLEASURE IN DOING THINGS: NOT AT ALL
SUM OF ALL RESPONSES TO PHQ QUESTIONS 1-9: 0

## 2025-03-12 NOTE — PROGRESS NOTES
Negative for blood in stool, diarrhea, nausea and vomiting.   Musculoskeletal:  Positive for arthralgias (Chronic).   Skin:  Negative for rash.   Neurological:  Positive for numbness (And tingling of the left fourth and fifth finger). Negative for dizziness, tremors, syncope, facial asymmetry, speech difficulty and weakness.   Psychiatric/Behavioral:  Positive for dysphoric mood (Related to the pain follows with pain management advised to have a discussion on medication). The patient is not nervous/anxious.         Vitals:    03/12/25 1158   BP: 138/82   Pulse: 63   Resp: 18   Temp: 98 °F (36.7 °C)   SpO2: 95%         Physical Exam  HENT:      Head: Normocephalic and atraumatic.      Right Ear: Hearing, tympanic membrane, ear canal and external ear normal.      Left Ear: Hearing, tympanic membrane, ear canal and external ear normal. Impacted cerumen: S/p disimpaction with curette and normal tympanic membrane visualized on otoscopy afterward.      Mouth/Throat:      Tongue: No lesions.      Palate: Mass and lesions present.      Tonsils: 0 on the right. 0 on the left.   Cardiovascular:      Rate and Rhythm: Normal rate and regular rhythm.      Pulses: Normal pulses.      Heart sounds: Normal heart sounds.   Pulmonary:      Effort: Pulmonary effort is normal.      Breath sounds: Normal breath sounds.   Abdominal:      General: There is no distension.      Palpations: Abdomen is soft.      Tenderness: There is no abdominal tenderness. There is no guarding or rebound.   Musculoskeletal:         General: Normal range of motion.      Right lower leg: Edema (Nonpitting improved) present.      Left lower leg: Edema (Improved) present.   Neurological:      General: No focal deficit present.      Sensory: No sensory deficit.      Motor: No weakness.      Comments: Numbness on the left fourth and fifth finger on the distribution of ulnar nerve.          Lab Results   Component Value Date    WBC 5.9 01/22/2025    HGB 13.4

## 2025-03-14 ENCOUNTER — RESULTS FOLLOW-UP (OUTPATIENT)
Dept: FAMILY MEDICINE CLINIC | Age: 89
End: 2025-03-14

## 2025-03-19 ENCOUNTER — TELEPHONE (OUTPATIENT)
Dept: FAMILY MEDICINE CLINIC | Age: 89
End: 2025-03-19

## 2025-03-19 DIAGNOSIS — L28.2 PRURITIC RASH: Primary | ICD-10-CM

## 2025-03-19 DIAGNOSIS — Z20.7 SCABIES EXPOSURE: ICD-10-CM

## 2025-03-19 RX ORDER — KETOCONAZOLE 20 MG/ML
SHAMPOO, SUSPENSION TOPICAL
Qty: 1 EACH | Refills: 1 | Status: SHIPPED | OUTPATIENT
Start: 2025-03-19

## 2025-03-19 RX ORDER — IVERMECTIN 3 MG/1
200 TABLET ORAL ONCE
Qty: 6 TABLET | Refills: 0 | Status: SHIPPED | OUTPATIENT
Start: 2025-03-19 | End: 2025-03-19

## 2025-03-19 NOTE — TELEPHONE ENCOUNTER
Patient calling stating that she is itching all over. She states some spots look like hive. She starts to itch them then they hurt. She states it has gotten worse since her appointment

## 2025-03-19 NOTE — TELEPHONE ENCOUNTER
Patient called and aware of the new medication. Patient stated that her son that takes care of her just went to the urgent care and was Dx with Scabies and now has concerns that's what it may be

## 2025-03-19 NOTE — ASSESSMENT & PLAN NOTE
Chronic, at goal (stable), She reports frequent urination, approximately every 1.5 to 2 hours. She is currently taking Lasix every other day. Blood work will be conducted to re-evaluate her kidney function due to her use of pain medication and Lasix. and lifestyle modifications recommended    Orders:    Basic Metabolic Panel; Future

## 2025-03-19 NOTE — ASSESSMENT & PLAN NOTE
Chronic, at goal (stable), She is currently taking vitamin D supplements. Her vitamin D levels will be rechecked during her next visit. and lifestyle modifications recommended    Orders:    Vitamin D 25 Hydroxy; Future

## 2025-03-23 PROBLEM — L30.9 ACUTE DERMATITIS: Status: ACTIVE | Noted: 2025-03-23

## 2025-06-16 ENCOUNTER — OFFICE VISIT (OUTPATIENT)
Dept: FAMILY MEDICINE CLINIC | Age: 89
End: 2025-06-16
Payer: COMMERCIAL

## 2025-06-16 VITALS
BODY MASS INDEX: 31.18 KG/M2 | TEMPERATURE: 98.3 F | DIASTOLIC BLOOD PRESSURE: 62 MMHG | HEIGHT: 66 IN | SYSTOLIC BLOOD PRESSURE: 112 MMHG | WEIGHT: 194 LBS | OXYGEN SATURATION: 94 % | HEART RATE: 61 BPM

## 2025-06-16 DIAGNOSIS — M79.671 RIGHT FOOT PAIN: ICD-10-CM

## 2025-06-16 DIAGNOSIS — N18.31 CKD STAGE 3A, GFR 45-59 ML/MIN (HCC): ICD-10-CM

## 2025-06-16 DIAGNOSIS — F51.01 PRIMARY INSOMNIA: ICD-10-CM

## 2025-06-16 DIAGNOSIS — I10 PRIMARY HYPERTENSION: Primary | ICD-10-CM

## 2025-06-16 DIAGNOSIS — Z00.00 MEDICARE ANNUAL WELLNESS VISIT, SUBSEQUENT: ICD-10-CM

## 2025-06-16 DIAGNOSIS — G62.9 NEUROPATHY: ICD-10-CM

## 2025-06-16 PROCEDURE — G0439 PPPS, SUBSEQ VISIT: HCPCS

## 2025-06-16 PROCEDURE — 96372 THER/PROPH/DIAG INJ SC/IM: CPT

## 2025-06-16 PROCEDURE — 1123F ACP DISCUSS/DSCN MKR DOCD: CPT

## 2025-06-16 PROCEDURE — 1159F MED LIST DOCD IN RCRD: CPT

## 2025-06-16 PROCEDURE — 99214 OFFICE O/P EST MOD 30 MIN: CPT

## 2025-06-16 RX ORDER — CYANOCOBALAMIN 1000 UG/ML
1000 INJECTION, SOLUTION INTRAMUSCULAR; SUBCUTANEOUS ONCE
Status: COMPLETED | OUTPATIENT
Start: 2025-06-16 | End: 2025-06-16

## 2025-06-16 RX ADMIN — CYANOCOBALAMIN 1000 MCG: 1000 INJECTION, SOLUTION INTRAMUSCULAR; SUBCUTANEOUS at 13:34

## 2025-06-16 ASSESSMENT — LIFESTYLE VARIABLES
HOW MANY STANDARD DRINKS CONTAINING ALCOHOL DO YOU HAVE ON A TYPICAL DAY: PATIENT DOES NOT DRINK
HOW OFTEN DO YOU HAVE A DRINK CONTAINING ALCOHOL: NEVER

## 2025-06-16 ASSESSMENT — PATIENT HEALTH QUESTIONNAIRE - PHQ9
SUM OF ALL RESPONSES TO PHQ QUESTIONS 1-9: 1
2. FEELING DOWN, DEPRESSED OR HOPELESS: SEVERAL DAYS
SUM OF ALL RESPONSES TO PHQ QUESTIONS 1-9: 1
1. LITTLE INTEREST OR PLEASURE IN DOING THINGS: NOT AT ALL
SUM OF ALL RESPONSES TO PHQ QUESTIONS 1-9: 1
SUM OF ALL RESPONSES TO PHQ QUESTIONS 1-9: 1

## 2025-06-16 NOTE — PATIENT INSTRUCTIONS
learn more about \"A Healthy Heart: Care Instructions.\"  Current as of: July 31, 2024  Content Version: 14.5  © 8439-0046 [employer].   Care instructions adapted under license by [employer]. If you have questions about a medical condition or this instruction, always ask your healthcare professional. [employer] disclaims any warranty or liability for your use of this information.    Personalized Preventive Plan for Greer Rios - 6/16/2025  Medicare offers a range of preventive health benefits. Some of the tests and screenings are paid in full while other may be subject to a deductible, co-insurance, and/or copay.  Some of these benefits include a comprehensive review of your medical history including lifestyle, illnesses that may run in your family, and various assessments and screenings as appropriate.  After reviewing your medical record and screening and assessments performed today your provider may have ordered immunizations, labs, imaging, and/or referrals for you.  A list of these orders (if applicable) as well as your Preventive Care list are included within your After Visit Summary for your review.

## 2025-06-16 NOTE — PROGRESS NOTES
has been using ZzzQuil  - Prescription for a higher dose of melatonin will be provided  - Advised to follow up in 3 months to assess effectiveness of treatment    Follow-up:  - Patient to follow up in 3 months   Follow-up on the x-ray of the foot  -Order blood work if fatigue is still persisting  - If B12 shots effective for neuropathy and fatigue can continue monthly     Return in about 3 months (around 9/16/2025) for Regular follow up .     Subjective     History of Present Illness  The patient presents for evaluation of finger pain, fatigue, foot pain, throat discomfort, and sleep disturbance.    Finger Pain  - Worsening condition in her finger, previously attributed to pressure on the ulnar nerve at the elbow  - Managing with braces and by keeping the elbow straight  - Interested in receiving a B12 shot to help with neuropathy and numbness in her hands    Fatigue  - Constant fatigue and lack of energy   - Reports no recent falls or memory issues  - Does not consume alcohol and does not have dentures  - Not interested in physical therapy or home care but open to receiving instructions for exercises  - Interested in trying oral B12 supplements daily    Foot Pain  - History of hammertoe with surgical intervention  - Soreness in her big toe causing discomfort for over a month  - Has not sought podiatric care recently  - Reports a history of gout  - Tightness in both legs but no calf pain  - Managing pain with Percocet and has not used any over-the-counter medications    Throat Discomfort  - Sensation of something being lodged in her throat  - No pain but bothersome  - No difficulty swallowing    Sleep Disturbance  - Using ZzzQuil to aid sleep  - Has not tried melatonin    Supplemental information: PAST SURGICAL HISTORY: Hammertoe surgery    SOCIAL HISTORY  - Does not drink alcohol       Patient's complete Health Risk Assessment and screening values have been reviewed and are found in Flowsheets. The following

## 2025-06-18 DIAGNOSIS — G62.9 NEUROPATHY: ICD-10-CM

## 2025-06-18 DIAGNOSIS — M79.671 RIGHT FOOT PAIN: ICD-10-CM

## 2025-06-18 NOTE — TELEPHONE ENCOUNTER
Greer Rios is calling to request a refill on the following medication(s):    Medication Request:  Requested Prescriptions     Pending Prescriptions Disp Refills    metoprolol succinate (TOPROL XL) 50 MG extended release tablet [Pharmacy Med Name: Metoprolol Succinate ER 50 MG Oral Tablet Extended Release 24 Hour] 60 tablet 0     Sig: Take 1 tablet by mouth twice daily       Last Visit Date (If Applicable):  6/16/2025    Next Visit Date:    Visit date not found

## 2025-06-19 RX ORDER — METOPROLOL SUCCINATE 50 MG/1
50 TABLET, EXTENDED RELEASE ORAL 2 TIMES DAILY
Qty: 60 TABLET | Refills: 0 | Status: SHIPPED | OUTPATIENT
Start: 2025-06-19

## 2025-06-20 ENCOUNTER — TELEPHONE (OUTPATIENT)
Dept: FAMILY MEDICINE CLINIC | Age: 89
End: 2025-06-20

## 2025-06-20 DIAGNOSIS — M79.673 PAIN OF FOOT, UNSPECIFIED LATERALITY: Primary | ICD-10-CM

## 2025-07-15 RX ORDER — METOPROLOL SUCCINATE 50 MG/1
50 TABLET, EXTENDED RELEASE ORAL 2 TIMES DAILY
Qty: 60 TABLET | Refills: 0 | Status: SHIPPED | OUTPATIENT
Start: 2025-07-15

## 2025-07-24 ENCOUNTER — OFFICE VISIT (OUTPATIENT)
Dept: FAMILY MEDICINE CLINIC | Age: 89
End: 2025-07-24
Payer: COMMERCIAL

## 2025-07-24 ENCOUNTER — HOSPITAL ENCOUNTER (OUTPATIENT)
Age: 89
Setting detail: SPECIMEN
Discharge: HOME | End: 2025-07-24

## 2025-07-24 VITALS
OXYGEN SATURATION: 98 % | DIASTOLIC BLOOD PRESSURE: 62 MMHG | HEIGHT: 66 IN | WEIGHT: 184 LBS | SYSTOLIC BLOOD PRESSURE: 180 MMHG | BODY MASS INDEX: 29.57 KG/M2 | TEMPERATURE: 97.4 F | HEART RATE: 65 BPM

## 2025-07-24 DIAGNOSIS — I49.3 PVC (PREMATURE VENTRICULAR CONTRACTION): ICD-10-CM

## 2025-07-24 DIAGNOSIS — R00.2 PALPITATIONS: ICD-10-CM

## 2025-07-24 DIAGNOSIS — R20.0 NUMBNESS OF FINGERS OF BOTH HANDS: ICD-10-CM

## 2025-07-24 DIAGNOSIS — R73.01 IFG (IMPAIRED FASTING GLUCOSE): ICD-10-CM

## 2025-07-24 DIAGNOSIS — I10 PRIMARY HYPERTENSION: ICD-10-CM

## 2025-07-24 DIAGNOSIS — L08.9 INFECTED CYST OF SKIN: ICD-10-CM

## 2025-07-24 DIAGNOSIS — R06.02 SHORTNESS OF BREATH: ICD-10-CM

## 2025-07-24 DIAGNOSIS — R94.31 ABNORMAL EKG: ICD-10-CM

## 2025-07-24 DIAGNOSIS — L72.0 EPIDERMAL CYST: Primary | ICD-10-CM

## 2025-07-24 DIAGNOSIS — L72.9 INFECTED CYST OF SKIN: ICD-10-CM

## 2025-07-24 PROBLEM — H61.22 IMPACTED CERUMEN, LEFT EAR: Status: RESOLVED | Noted: 2024-09-09 | Resolved: 2025-07-24

## 2025-07-24 LAB
ALBUMIN SERPL-MCNC: 4.3 G/DL (ref 3.5–5.2)
ALBUMIN/GLOB SERPL: 1.1 {RATIO} (ref 1–2.5)
ALP SERPL-CCNC: 73 U/L (ref 35–104)
ALT SERPL-CCNC: 9 U/L (ref 10–35)
ANION GAP SERPL CALCULATED.3IONS-SCNC: 12 MMOL/L (ref 9–16)
AST SERPL-CCNC: 16 U/L (ref 10–35)
BASOPHILS # BLD: 0.08 K/UL (ref 0–0.2)
BASOPHILS NFR BLD: 2 % (ref 0–2)
BILIRUB SERPL-MCNC: 0.3 MG/DL (ref 0–1.2)
BUN SERPL-MCNC: 12 MG/DL (ref 8–23)
CALCIUM SERPL-MCNC: 10.3 MG/DL (ref 8.6–10.4)
CHLORIDE SERPL-SCNC: 103 MMOL/L (ref 98–107)
CO2 SERPL-SCNC: 26 MMOL/L (ref 20–31)
CREAT SERPL-MCNC: 1.1 MG/DL (ref 0.6–0.9)
EOSINOPHIL # BLD: 0.13 K/UL (ref 0–0.44)
EOSINOPHILS RELATIVE PERCENT: 3 % (ref 1–4)
ERYTHROCYTE [DISTWIDTH] IN BLOOD BY AUTOMATED COUNT: 12.4 % (ref 11.8–14.4)
EST. AVERAGE GLUCOSE BLD GHB EST-MCNC: 123 MG/DL
GFR, ESTIMATED: 48 ML/MIN/1.73M2
GLUCOSE SERPL-MCNC: 124 MG/DL (ref 74–99)
HBA1C MFR BLD: 5.9 % (ref 4–6)
HCT VFR BLD AUTO: 45.3 % (ref 36.3–47.1)
HGB BLD-MCNC: 14.7 G/DL (ref 11.9–15.1)
IMM GRANULOCYTES # BLD AUTO: <0.03 K/UL (ref 0–0.3)
IMM GRANULOCYTES NFR BLD: 0 %
LYMPHOCYTES NFR BLD: 1.72 K/UL (ref 1.1–3.7)
LYMPHOCYTES RELATIVE PERCENT: 39 % (ref 24–43)
MAGNESIUM SERPL-MCNC: 2.3 MG/DL (ref 1.6–2.4)
MCH RBC QN AUTO: 30.2 PG (ref 25.2–33.5)
MCHC RBC AUTO-ENTMCNC: 32.5 G/DL (ref 28.4–34.8)
MCV RBC AUTO: 93.2 FL (ref 82.6–102.9)
MONOCYTES NFR BLD: 0.49 K/UL (ref 0.1–1.2)
MONOCYTES NFR BLD: 11 % (ref 3–12)
NEUTROPHILS NFR BLD: 45 % (ref 36–65)
NEUTS SEG NFR BLD: 2.03 K/UL (ref 1.5–8.1)
NRBC BLD-RTO: 0 PER 100 WBC
PLATELET # BLD AUTO: 342 K/UL (ref 138–453)
PMV BLD AUTO: 10.1 FL (ref 8.1–13.5)
POTASSIUM SERPL-SCNC: 4.3 MMOL/L (ref 3.7–5.3)
PROT SERPL-MCNC: 8.2 G/DL (ref 6.6–8.7)
RBC # BLD AUTO: 4.86 M/UL (ref 3.95–5.11)
SODIUM SERPL-SCNC: 141 MMOL/L (ref 136–145)
TSH SERPL DL<=0.05 MIU/L-ACNC: 1.45 UIU/ML (ref 0.27–4.2)
WBC OTHER # BLD: 4.5 K/UL (ref 3.5–11.3)

## 2025-07-24 PROCEDURE — 10060 I&D ABSCESS SIMPLE/SINGLE: CPT | Performed by: NURSE PRACTITIONER

## 2025-07-24 PROCEDURE — 1123F ACP DISCUSS/DSCN MKR DOCD: CPT | Performed by: NURSE PRACTITIONER

## 2025-07-24 PROCEDURE — 99215 OFFICE O/P EST HI 40 MIN: CPT | Performed by: NURSE PRACTITIONER

## 2025-07-24 PROCEDURE — 1159F MED LIST DOCD IN RCRD: CPT | Performed by: NURSE PRACTITIONER

## 2025-07-24 PROCEDURE — 93000 ELECTROCARDIOGRAM COMPLETE: CPT | Performed by: NURSE PRACTITIONER

## 2025-07-24 RX ORDER — NEBULIZER AND COMPRESSOR
EACH MISCELLANEOUS
Qty: 1 KIT | Refills: 1 | Status: SHIPPED | OUTPATIENT
Start: 2025-07-24

## 2025-07-24 RX ORDER — DOXYCYCLINE HYCLATE 100 MG
100 TABLET ORAL 2 TIMES DAILY
Qty: 14 TABLET | Refills: 0 | Status: SHIPPED | OUTPATIENT
Start: 2025-07-24 | End: 2025-07-31

## 2025-07-24 RX ORDER — AMLODIPINE BESYLATE 5 MG/1
5 TABLET ORAL DAILY
Qty: 30 TABLET | Refills: 0 | Status: SHIPPED | OUTPATIENT
Start: 2025-07-24

## 2025-07-24 SDOH — ECONOMIC STABILITY: FOOD INSECURITY: WITHIN THE PAST 12 MONTHS, THE FOOD YOU BOUGHT JUST DIDN'T LAST AND YOU DIDN'T HAVE MONEY TO GET MORE.: PATIENT DECLINED

## 2025-07-24 SDOH — ECONOMIC STABILITY: FOOD INSECURITY: WITHIN THE PAST 12 MONTHS, YOU WORRIED THAT YOUR FOOD WOULD RUN OUT BEFORE YOU GOT MONEY TO BUY MORE.: PATIENT DECLINED

## 2025-07-24 ASSESSMENT — ENCOUNTER SYMPTOMS
COUGH: 0
COLOR CHANGE: 0
STRIDOR: 0
NAUSEA: 0
VOMITING: 0
CHOKING: 0
ALLERGIC/IMMUNOLOGIC NEGATIVE: 1
SHORTNESS OF BREATH: 1
EYES NEGATIVE: 1
DIARRHEA: 0

## 2025-07-24 ASSESSMENT — PATIENT HEALTH QUESTIONNAIRE - PHQ9: DEPRESSION UNABLE TO ASSESS: FUNCTIONAL CAPACITY MOTIVATION LIMITS ACCURACY

## 2025-07-24 NOTE — PROGRESS NOTES
[facility]  [address] [place] [address]  Dept: [phone]    Greer Rios is a 89 y.o. female who presents today for her medical conditions/complaintsas noted below.      Greer Rios is here today c/o Cyst (Sore on head )      Past Medical History:   Diagnosis Date    Allergic rhinitis 2014    Asthma 2014    Back pain     Cataract     Diabetes mellitus (HCC)     DM2 2014    Headache(784.0)     HTN (hypertension) 2014    Hypercholesterolemia     Hyperlipidemia     Hypertension     Insomnia, unspecified 2014    Lumbar stenosis     Neuropathy     Neuropathy 2014    Osteoarthritis     Osteoarthrosis 2014    Other acute reactions to stress 2014    Other secondary hypertension, benign 2014    Pure hypercholesterolemia 2014    Sleep apnea 2014    Unspecified sleep apnea       Past Surgical History:   Procedure Laterality Date    ARTERY BIOPSY Bilateral 2016    bilateral temporal artery biopsy    CATARACT REMOVAL      bilateral eyes    HEMORRHOID SURGERY         Family History   Problem Relation Age of Onset    Hypertension Mother     Heart Disease Mother     Heart Disease Father     Hypertension Father     Diabetes Father     Substance Abuse Father         alcoholic    Heart Disease Sister     Diabetes Sister     Cancer Brother 83        stomach    Heart Disease Brother     Diabetes Brother        Social History     Tobacco Use    Smoking status: Former     Current packs/day: 0.00     Average packs/day: 2.0 packs/day for 19.0 years (38.0 ttl pk-yrs)     Types: Cigarettes     Start date: 10/1/1974     Quit date: 10/1/1993     Years since quittin.8    Smokeless tobacco: Never   Substance Use Topics    Alcohol use: No      Current Outpatient Medications   Medication Sig Dispense Refill    metoprolol succinate (TOPROL XL) 50 MG extended release tablet Take 1 tablet by mouth twice daily 60 tablet 0    pantoprazole

## 2025-07-25 PROBLEM — E87.5 HYPERKALEMIA: Status: RESOLVED | Noted: 2020-05-18 | Resolved: 2025-07-25

## 2025-07-25 PROBLEM — B35.1 DERMATOPHYTOSIS OF NAIL: Status: RESOLVED | Noted: 2020-04-30 | Resolved: 2025-07-25

## 2025-07-25 PROBLEM — R25.3 JERKING: Status: RESOLVED | Noted: 2020-05-18 | Resolved: 2025-07-25

## 2025-07-25 PROBLEM — R60.9 NON-PITTING EDEMA: Status: RESOLVED | Noted: 2024-08-29 | Resolved: 2025-07-25

## 2025-07-25 PROBLEM — R26.9 ABNORMAL GAIT: Status: RESOLVED | Noted: 2020-04-30 | Resolved: 2025-07-25

## 2025-07-25 PROBLEM — R53.82 CHRONIC FATIGUE: Status: RESOLVED | Noted: 2017-06-02 | Resolved: 2025-07-25

## 2025-07-25 PROBLEM — R30.0 DYSURIA: Status: RESOLVED | Noted: 2020-05-18 | Resolved: 2025-07-25

## 2025-07-25 PROBLEM — N34.2 INFECTIVE URETHRITIS: Status: RESOLVED | Noted: 2020-05-18 | Resolved: 2025-07-25

## 2025-07-25 PROBLEM — R73.01 IFG (IMPAIRED FASTING GLUCOSE): Status: ACTIVE | Noted: 2025-07-25

## 2025-07-25 PROBLEM — J02.9 ACUTE PHARYNGITIS: Status: RESOLVED | Noted: 2024-12-09 | Resolved: 2025-07-25

## 2025-07-25 PROBLEM — M79.89 LEG SWELLING: Status: RESOLVED | Noted: 2020-05-18 | Resolved: 2025-07-25

## 2025-07-25 PROBLEM — L30.9 ACUTE DERMATITIS: Status: RESOLVED | Noted: 2025-03-23 | Resolved: 2025-07-25

## 2025-08-11 ENCOUNTER — HOSPITAL ENCOUNTER (OUTPATIENT)
Age: 89
Setting detail: SPECIMEN
Discharge: HOME | End: 2025-08-11

## 2025-08-11 ENCOUNTER — OFFICE VISIT (OUTPATIENT)
Dept: FAMILY MEDICINE CLINIC | Age: 89
End: 2025-08-11
Payer: COMMERCIAL

## 2025-08-11 VITALS
OXYGEN SATURATION: 95 % | TEMPERATURE: 97.4 F | SYSTOLIC BLOOD PRESSURE: 168 MMHG | HEART RATE: 62 BPM | DIASTOLIC BLOOD PRESSURE: 69 MMHG

## 2025-08-11 DIAGNOSIS — R20.2 NUMBNESS AND TINGLING IN BOTH HANDS: ICD-10-CM

## 2025-08-11 DIAGNOSIS — R35.0 URINARY FREQUENCY: ICD-10-CM

## 2025-08-11 DIAGNOSIS — N18.31 STAGE 3A CHRONIC KIDNEY DISEASE (HCC): ICD-10-CM

## 2025-08-11 DIAGNOSIS — L72.0 EPIDERMAL CYST OF FACE: ICD-10-CM

## 2025-08-11 DIAGNOSIS — R20.0 NUMBNESS AND TINGLING IN BOTH HANDS: ICD-10-CM

## 2025-08-11 DIAGNOSIS — R00.1 BRADYCARDIA: ICD-10-CM

## 2025-08-11 DIAGNOSIS — R73.03 PRE-DIABETES: ICD-10-CM

## 2025-08-11 DIAGNOSIS — Z76.89 ENCOUNTER TO ESTABLISH CARE: Primary | ICD-10-CM

## 2025-08-11 DIAGNOSIS — G47.01 INSOMNIA DUE TO MEDICAL CONDITION: ICD-10-CM

## 2025-08-11 DIAGNOSIS — Z86.69 HISTORY OF SLEEP APNEA: ICD-10-CM

## 2025-08-11 DIAGNOSIS — R07.9 CHEST PAIN, UNSPECIFIED TYPE: ICD-10-CM

## 2025-08-11 DIAGNOSIS — R06.09 DYSPNEA ON EXERTION: ICD-10-CM

## 2025-08-11 DIAGNOSIS — I10 PRIMARY HYPERTENSION: ICD-10-CM

## 2025-08-11 DIAGNOSIS — R94.31 ABNORMAL EKG: ICD-10-CM

## 2025-08-11 PROBLEM — G47.31 PRIMARY CENTRAL SLEEP APNEA: Status: RESOLVED | Noted: 2020-04-30 | Resolved: 2025-08-11

## 2025-08-11 PROBLEM — R73.01 IFG (IMPAIRED FASTING GLUCOSE): Status: RESOLVED | Noted: 2025-07-25 | Resolved: 2025-08-11

## 2025-08-11 PROBLEM — G56.21 ULNAR NERVE ENTRAPMENT AT ELBOW, RIGHT: Status: RESOLVED | Noted: 2024-09-09 | Resolved: 2025-08-11

## 2025-08-11 LAB
BILIRUB UR QL STRIP: NEGATIVE
CLARITY UR: CLEAR
COLOR UR: YELLOW
COMMENT: NORMAL
GLUCOSE UR STRIP-MCNC: NEGATIVE MG/DL
HGB UR QL STRIP.AUTO: NEGATIVE
KETONES UR STRIP-MCNC: NEGATIVE MG/DL
LEUKOCYTE ESTERASE UR QL STRIP: NEGATIVE
NITRITE UR QL STRIP: NEGATIVE
PH UR STRIP: 7.5 [PH] (ref 5–8)
PROT UR STRIP-MCNC: NEGATIVE MG/DL
SP GR UR STRIP: 1.01 (ref 1–1.03)
UROBILINOGEN UR STRIP-ACNC: NORMAL EU/DL (ref 0–1)

## 2025-08-11 PROCEDURE — 1123F ACP DISCUSS/DSCN MKR DOCD: CPT | Performed by: NURSE PRACTITIONER

## 2025-08-11 PROCEDURE — 1160F RVW MEDS BY RX/DR IN RCRD: CPT | Performed by: NURSE PRACTITIONER

## 2025-08-11 PROCEDURE — 99215 OFFICE O/P EST HI 40 MIN: CPT | Performed by: NURSE PRACTITIONER

## 2025-08-11 PROCEDURE — 1159F MED LIST DOCD IN RCRD: CPT | Performed by: NURSE PRACTITIONER

## 2025-08-11 RX ORDER — UBIDECARENONE 75 MG
1000 CAPSULE ORAL DAILY
COMMUNITY
End: 2025-08-11

## 2025-08-11 RX ORDER — AMLODIPINE BESYLATE 10 MG/1
10 TABLET ORAL DAILY
Qty: 30 TABLET | Refills: 5 | Status: SHIPPED | OUTPATIENT
Start: 2025-08-11

## 2025-08-11 RX ORDER — METOPROLOL SUCCINATE 50 MG/1
50 TABLET, EXTENDED RELEASE ORAL DAILY
Qty: 30 TABLET | Refills: 5 | Status: SHIPPED | OUTPATIENT
Start: 2025-08-11

## 2025-08-11 RX ORDER — ACETAMINOPHEN 160 MG
2000 TABLET,DISINTEGRATING ORAL DAILY
COMMUNITY

## 2025-08-11 RX ORDER — MAGNESIUM OXIDE 400 MG/1
400 TABLET ORAL 2 TIMES DAILY
COMMUNITY
Start: 2025-08-11

## 2025-08-11 RX ORDER — LANOLIN ALCOHOL/MO/W.PET/CERES
1000 CREAM (GRAM) TOPICAL DAILY
COMMUNITY
Start: 2025-08-11

## 2025-08-11 RX ORDER — DOCUSATE SODIUM 100 MG/1
100 CAPSULE, LIQUID FILLED ORAL 2 TIMES DAILY PRN
COMMUNITY
Start: 2025-08-11

## 2025-08-11 ASSESSMENT — ENCOUNTER SYMPTOMS
STRIDOR: 0
EYES NEGATIVE: 1
DIARRHEA: 0
ALLERGIC/IMMUNOLOGIC NEGATIVE: 1
CONSTIPATION: 0
SHORTNESS OF BREATH: 1
VOMITING: 0
CHEST TIGHTNESS: 0
GASTROINTESTINAL NEGATIVE: 1
NAUSEA: 0
CHOKING: 0
COLOR CHANGE: 0

## 2025-08-12 PROBLEM — N32.81 OAB (OVERACTIVE BLADDER): Status: ACTIVE | Noted: 2025-08-12

## 2025-08-12 LAB
MICROORGANISM SPEC CULT: NORMAL
SERVICE CMNT-IMP: NORMAL
SPECIMEN DESCRIPTION: NORMAL

## 2025-08-27 ENCOUNTER — OFFICE VISIT (OUTPATIENT)
Age: 89
End: 2025-08-27

## 2025-08-27 VITALS
HEART RATE: 73 BPM | OXYGEN SATURATION: 93 % | SYSTOLIC BLOOD PRESSURE: 150 MMHG | DIASTOLIC BLOOD PRESSURE: 72 MMHG | BODY MASS INDEX: 29.86 KG/M2 | WEIGHT: 185 LBS

## 2025-08-27 DIAGNOSIS — H60.311 ACUTE DIFFUSE OTITIS EXTERNA OF RIGHT EAR: ICD-10-CM

## 2025-08-27 DIAGNOSIS — H61.21 IMPACTED CERUMEN, RIGHT EAR: Primary | ICD-10-CM

## 2025-08-27 PROBLEM — G43.909 MIGRAINE: Status: ACTIVE | Noted: 2025-01-13

## 2025-08-27 RX ORDER — OFLOXACIN 3 MG/ML
5 SOLUTION AURICULAR (OTIC) 2 TIMES DAILY
Qty: 5 ML | Refills: 0 | Status: SHIPPED | OUTPATIENT
Start: 2025-08-27 | End: 2025-09-03

## 2025-08-28 ASSESSMENT — ENCOUNTER SYMPTOMS
RHINORRHEA: 0
COUGH: 0